# Patient Record
Sex: FEMALE | Race: BLACK OR AFRICAN AMERICAN | URBAN - METROPOLITAN AREA
[De-identification: names, ages, dates, MRNs, and addresses within clinical notes are randomized per-mention and may not be internally consistent; named-entity substitution may affect disease eponyms.]

---

## 2017-01-12 DIAGNOSIS — G47.00 INSOMNIA, UNSPECIFIED TYPE: ICD-10-CM

## 2017-01-17 RX ORDER — ZOLPIDEM TARTRATE 5 MG/1
5 TABLET ORAL
Qty: 30 TAB | Refills: 1 | OUTPATIENT
Start: 2017-01-17 | End: 2017-02-22 | Stop reason: SDUPTHER

## 2017-01-30 ENCOUNTER — TELEPHONE (OUTPATIENT)
Dept: FAMILY MEDICINE CLINIC | Age: 32
End: 2017-01-30

## 2017-01-30 NOTE — TELEPHONE ENCOUNTER
Call placed to patient in regards to refill request for alprazolam. NP advises that patient needs to been seen in regards to request. Received a recording \" the person you are trying to reach is not accepting calls at this time

## 2017-02-20 ENCOUNTER — ED HISTORICAL/CONVERTED ENCOUNTER (OUTPATIENT)
Dept: OTHER | Age: 32
End: 2017-02-20

## 2017-02-22 ENCOUNTER — OFFICE VISIT (OUTPATIENT)
Dept: FAMILY MEDICINE CLINIC | Age: 32
End: 2017-02-22

## 2017-02-22 VITALS
DIASTOLIC BLOOD PRESSURE: 80 MMHG | WEIGHT: 152 LBS | OXYGEN SATURATION: 98 % | RESPIRATION RATE: 18 BRPM | BODY MASS INDEX: 26.93 KG/M2 | SYSTOLIC BLOOD PRESSURE: 121 MMHG | HEART RATE: 107 BPM | TEMPERATURE: 98.4 F | HEIGHT: 63 IN

## 2017-02-22 DIAGNOSIS — G40.909 SEIZURE DISORDER (HCC): Primary | ICD-10-CM

## 2017-02-22 DIAGNOSIS — F41.9 ANXIETY: ICD-10-CM

## 2017-02-22 DIAGNOSIS — G47.00 INSOMNIA, UNSPECIFIED TYPE: ICD-10-CM

## 2017-02-22 RX ORDER — LEVETIRACETAM 750 MG/1
750 TABLET ORAL 2 TIMES DAILY
Qty: 30 TAB | Refills: 0 | Status: SHIPPED | OUTPATIENT
Start: 2017-02-22

## 2017-02-22 RX ORDER — ZOLPIDEM TARTRATE 5 MG/1
5 TABLET ORAL
Qty: 30 TAB | Refills: 0 | Status: SHIPPED | OUTPATIENT
Start: 2017-02-22 | End: 2017-03-17 | Stop reason: SDUPTHER

## 2017-02-22 RX ORDER — ALPRAZOLAM 0.25 MG/1
0.25 TABLET ORAL
Qty: 30 TAB | Refills: 0 | Status: SHIPPED | OUTPATIENT
Start: 2017-02-22 | End: 2022-04-25

## 2017-02-22 NOTE — PROGRESS NOTES
Polina Farah is a 32 y.o. female who presents with the following complaints:  Chief Complaint   Patient presents with    Seizure     f/u    Insomnia     f/u     Medication Refill     Ambien & Keppra       Subjective:    HPI:   Has not established with new neurologist yet (Dr. Buell Jeans)- she has an appt on March 8th. She has not run out of keppra. No seizures since September 2016. Sleeping well with ambien since reducing to 5 mg. She takes it most nights. She continues to take xanax 2-3 times daily for her anxiety. She has an appt with her psychiatrist next week. Reports sleep deprivation is primary trigger for her seizures. Without the [employer] she lies away until 3-4 am with racing thoughts/anxiety/worry. Pertinent PMH/FH/SH:  Past Medical History:   Diagnosis Date    Anemia     Epilepsy ([facility] [address]     Hernia     Seizure ([facility] [address]      No past surgical history on file.   Family History   Problem Relation Age of Onset    Other Mother      growth/tumor on brain    Migraines Mother     Hypertension Mother      Social History     Social History    Marital status: SINGLE     Spouse name: N/A    Number of children: N/A    Years of education: N/A     Social History Main Topics    Smoking status: Current Every Day Smoker     Packs/day: 0.25    Smokeless tobacco: None    Alcohol use Yes      Comment: rarely    Drug use: No    Sexual activity: Yes     Other Topics Concern    None     Social History Narrative     Advanced Directives: N      Patient Active Problem List    Diagnosis    Seizure disorder ([facility] [address]    Insomnia    Anxiety       Nurse notes were reviewed and are correct  Review of Systems - negative except as listed above in the HPI    Objective:     Vitals:    02/22/17 1340   BP: 121/80   Pulse: (!) 107   Resp: 18   Temp: 98.4 °F (36.9 °C)   TempSrc: Oral   SpO2: 98%   Weight: 152 lb (68.9 kg)   Height: 5' 3\" (1.6 m)     Physical Examination: General appearance - alert, well appearing, and in no distress, oriented to person, place, and time and normal appearing weight  Mental status - anxious  Neck - supple, no significant adenopathy  Chest - clear to auscultation, no wheezes, rales or rhonchi, symmetric air entry  Heart - normal rate, regular rhythm, normal S1, S2, no murmurs, rubs, clicks or gallops, no JVD  Neurological - alert, oriented, normal speech, no focal findings or movement disorder noted  Extremities - no pedal edema noted  Skin - normal coloration and turgor, no rashes, no suspicious skin lesions noted    Assessment/ Plan:   Beatrice was seen today for seizure, insomnia and medication refill. Diagnoses and all orders for this visit:    Seizure disorder ([facility] [address]  Seizure-free x 5 months  Will refill x 30 days to ensure she does not run out before seeing neurologist  Will defer drug management for seizures to neurology  -     levETIRAcetam (KEPPRA) 750 mg tablet; Take 1 Tab by mouth two (2) times a day. Insomnia, unspecified type  dwp ambien not recommended for long term use- she has been using nightly for several years  Recommend exploring options for tx of anxiety with her psychiatrist at next appt, which would likely also improve her sleep  -     zolpidem (AMBIEN) 5 mg tablet; Take 1 Tab by mouth nightly as needed for Sleep. Max Daily Amount: 5 mg. Anxiety  Not on any maintenance therapy such as SSRI  Poorly controlled, frequently uses xanax  Will refill small quantity to get her to through to next week's psychiatry appt- subsequent refills should come from psych  Needs better overall control of symptoms- see above  -     ALPRAZolam (XANAX) 0.25 mg tablet; Take 1 Tab by mouth two (2) times daily as needed for Anxiety. Max Daily Amount: 0.5 mg. Follow-up Disposition:  Return in about 6 months (around 8/22/2017). I have discussed the diagnosis with the patient and the intended plan as seen in the above orders.   The patient has received an after-visit summary and questions were answered concerning future plans. The patient verbalizes understanding. Medication Side Effects and Warnings were discussed with patient: yes  Patient Labs were reviewed and or requested: no  Patient Past Records were reviewed and or requested: yes    Patient Instructions        Epilepsy: Care Instructions  Your Care Instructions  Epilepsy is a common condition that causes repeated seizures. The seizures are caused by bursts of electrical activity in the brain that aren't normal. Seizures may cause problems with muscle control, movement, speech, vision, or awareness. They can be scary. Epilepsy affects each person differently. Some people have only a few seizures. Others get them more often. If you know what triggers a seizure, you may be able to avoid having one. You can take medicines to control and reduce seizures. You and your doctor will need to find the right combination, schedule, and dose of medicine. This may take time and careful changes. Seizures may get worse and happen more often over time. Follow-up care is a key part of your treatment and safety. Be sure to make and go to all appointments, and call your doctor if you are having problems. It's also a good idea to know your test results and keep a list of the medicines you take. How can you care for yourself at home? · Be safe with medicines. Take your medicines exactly as prescribed. Call your doctor if you think you are having a problem with your medicine. · Make a treatment plan with your doctor. Be sure to follow your plan. · Try to identify and avoid things that may make you more likely to have a seizure. These may include:  ¨ Not getting enough sleep. ¨ Using drugs or alcohol. ¨ Being emotionally stressed. ¨ Skipping meals. · Keep a record of any seizures you have. Note the date, time of day, and any details about the seizure that you can remember. Your doctor can use this information to plan or adjust your medicine or other treatment.   · Be sure that any doctor treating you for another condition knows that you have epilepsy. Each doctor should know what medicines you are taking, if any. · Wear a medical ID bracelet. You can buy this at most drugstores. If you have a seizure that leaves you unconscious or unable to speak for yourself, this bracelet will let those who are treating you know that you have epilepsy. · Talk to your doctor about whether it is safe for you to do certain activities, such as drive or swim. When should you call for help? Call 911 anytime you think you may need emergency care. For example, call if:  · A seizure does not stop as it normally does. · You have new symptoms such as:  ¨ Numbness, tingling, or weakness on one side of your body or face. ¨ Vision changes. ¨ Trouble speaking or thinking clearly. Call your doctor now or seek immediate medical care if:  · You have a fever. · You have a severe headache. Watch closely for changes in your health, and be sure to contact your doctor if:  · The normal pattern or features of your seizures change. Where can you learn more? Go to http://erosWindPole Venturesangelina.info/. Antoine Lawson in the search box to learn more about \"Epilepsy: Care Instructions. \"  Current as of: February 19, 2016  Content Version: 11.1  © 7496-0719 "[employer]. Care instructions adapted under license by [employer] (which disclaims liability or warranty for this information). If you have questions about a medical condition or this instruction, always ask your healthcare professional. [place] [address] any warranty or liability for your use of this information. Insomnia: Care Instructions  Your Care Instructions  Insomnia is the inability to sleep well. It is a common problem for most people at some time. Insomnia may make it hard for you to get to sleep, stay asleep, or sleep as long as you need to. This can make you tired and grouchy during the day. It can also make you forgetful, less effective at work, and unhappy. Insomnia can be caused by conditions such as depression or anxiety. Pain can also affect your ability to sleep. When these problems are solved, the insomnia usually clears up. But sometimes bad sleep habits can cause insomnia. If insomnia is affecting your work or your enjoyment of life, you can take steps to improve your sleep. Follow-up care is a key part of your treatment and safety. Be sure to make and go to all appointments, and call your doctor if you are having problems. It's also a good idea to know your test results and keep a list of the medicines you take. How can you care for yourself at home? What to avoid  · Do not have drinks with caffeine, such as coffee or black tea, for 8 hours before bed. · Do not smoke or use other types of tobacco near bedtime. Nicotine is a stimulant and can keep you awake. · Avoid drinking alcohol late in the evening, because it can cause you to wake in the middle of the night. · Do not eat a big meal close to bedtime. If you are hungry, eat a light snack. · Do not drink a lot of water close to bedtime, because the need to urinate may wake you up during the night. · Do not read or watch TV in bed. Use the bed only for sleeping and sexual activity. What to try  · Go to bed at the same time every night, and wake up at the same time every morning. Do not take naps during the day. · Keep your bedroom quiet, dark, and cool. · Sleep on a comfortable pillow and mattress. · If watching the clock makes you anxious, turn it facing away from you so you cannot see the time. · If you worry when you lie down, start a worry book. Well before bedtime, write down your worries, and then set the book and your concerns aside. · Try meditation or other relaxation techniques before you go to bed. · If you cannot fall asleep, get up and go to another room until you feel sleepy. Do something relaxing.  Repeat your bedtime routine before you go to bed again. · Make your house quiet and calm about an hour before bedtime. Turn down the lights, turn off the TV, log off the computer, and turn down the volume on music. This can help you relax after a busy day. When should you call for help? Watch closely for changes in your health, and be sure to contact your doctor if:  · Your efforts to improve your sleep do not work. · Your insomnia gets worse. · You have been feeling down, depressed, or hopeless or have lost interest in things that you usually enjoy. Where can you learn more? Go to http://eros-angelina.info/. Enter P513 in the search box to learn more about \"Insomnia: Care Instructions. \"  Current as of: July 26, 2016  Content Version: 11.1  © 5708-2365 [employer]. Care instructions adapted under license by [employer] (which disclaims liability or warranty for this information). If you have questions about a medical condition or this instruction, always ask your healthcare professional. [place] [address] any warranty or liability for your use of this information. Anxiety Disorder: Care Instructions  Your Care Instructions  Anxiety is a normal reaction to stress. Difficult situations can cause you to have symptoms such as sweaty palms and a nervous feeling. In an anxiety disorder, the symptoms are far more severe. Constant worry, muscle tension, trouble sleeping, nausea and diarrhea, and other symptoms can make normal daily activities difficult or impossible. These symptoms may occur for no reason, and they can affect your work, school, or social life. Medicines, counseling, and self-care can all help. Follow-up care is a key part of your treatment and safety. Be sure to make and go to all appointments, and call your doctor if you are having problems. It's also a good idea to know your test results and keep a list of the medicines you take.   How can you care for yourself at home? · Take medicines exactly as directed. Call your doctor if you think you are having a problem with your medicine. · Go to your counseling sessions and follow-up appointments. · Recognize and accept your anxiety. Then, when you are in a situation that makes you anxious, say to yourself, \"This is not an emergency. I feel uncomfortable, but I am not in danger. I can keep going even if I feel anxious. \"  · Be kind to your body:  ¨ Relieve tension with exercise or a massage. ¨ Get enough rest.  ¨ Avoid alcohol, caffeine, nicotine, and illegal drugs. They can increase your anxiety level and cause sleep problems. ¨ Learn and do relaxation techniques. See below for more about these techniques. · Engage your mind. Get out and do something you enjoy. Go to a funny movie, or take a walk or hike. Plan your day. Having too much or too little to do can make you anxious. · Keep a record of your symptoms. Discuss your fears with a good friend or family member, or join a support group for people with similar problems. Talking to others sometimes relieves stress. · Get involved in social groups, or volunteer to help others. Being alone sometimes makes things seem worse than they are. · Get at least 30 minutes of exercise on most days of the week to relieve stress. Walking is a good choice. You also may want to do other activities, such as running, swimming, cycling, or playing tennis or team sports. Relaxation techniques  Do relaxation exercises 10 to 20 minutes a day. You can play soothing, relaxing music while you do them, if you wish. · Tell others in your house that you are going to do your relaxation exercises. Ask them not to disturb you. · Find a comfortable place, away from all distractions and noise. · Lie down on your back, or sit with your back straight. · Focus on your breathing. Make it slow and steady. · Breathe in through your nose.  Breathe out through either your nose or mouth.  · Breathe deeply, filling up the area between your navel and your rib cage. Breathe so that your belly goes up and down. · Do not hold your breath. · Breathe like this for 5 to 10 minutes. Notice the feeling of calmness throughout your whole body. As you continue to breathe slowly and deeply, relax by doing the following for another 5 to 10 minutes:  · Tighten and relax each muscle group in your body. You can begin at your toes and work your way up to your head. · Imagine your muscle groups relaxing and becoming heavy. · Empty your mind of all thoughts. · Let yourself relax more and more deeply. · Become aware of the state of calmness that surrounds you. · When your relaxation time is over, you can bring yourself back to alertness by moving your fingers and toes and then your hands and feet and then stretching and moving your entire body. Sometimes people fall asleep during relaxation, but they usually wake up shortly afterward. · Always give yourself time to return to full alertness before you drive a car or do anything that might cause an accident if you are not fully alert. Never play a relaxation tape while you drive a car. When should you call for help? Call 911 anytime you think you may need emergency care. For example, call if:  · You feel you cannot stop from hurting yourself or someone else. Keep the numbers for these national suicide hotlines: 9-559-480-TALK (1-496.164.4097) and 0-783-UVUZRRB (6-772.533.8283). If you or someone you know talks about suicide or feeling hopeless, get help right away. Watch closely for changes in your health, and be sure to contact your doctor if:  · You have anxiety or fear that affects your life. · You have symptoms of anxiety that are new or different from those you had before. Where can you learn more? Go to http://eros-angelina.info/.   Enter P754 in the search box to learn more about \"Anxiety Disorder: Care Instructions. \"  Current as of: July 26, 2016  Content Version: 11.1  © 1852-6270 [employer]. Care instructions adapted under license by [employer] (which disclaims liability or warranty for this information). If you have questions about a medical condition or this instruction, always ask your healthcare professional. [address] any warranty or liability for your use of this information.           Vianey GONZALEZ

## 2017-02-22 NOTE — MR AVS SNAPSHOT
Visit Information Date & Time Provider Department Dept. Phone Encounter #  
 2/22/2017  1:45 PM Helene Palomares, [facility] [address] [facility] [id number] Follow-up Instructions Return in about 6 months (around 8/22/2017). Upcoming Health Maintenance Date Due Pneumococcal 19-64 Medium Risk (1 of 1 - PPSV23) 8/20/2004 DTaP/Tdap/Td series (1 - Tdap) 8/20/2006 PAP AKA CERVICAL CYTOLOGY 8/20/2006 INFLUENZA AGE 9 TO ADULT 8/1/2016 Allergies as of 2/22/2017  Review Complete On: 2/22/2017 By: Sean Billing Severity Noted Reaction Type Reactions Codeine  11/30/2016    Hives Sulfa (Sulfonamide Antibiotics)  11/30/2016    Hives Current Immunizations  Never Reviewed No immunizations on file. Not reviewed this visit You Were Diagnosed With   
  
 Codes Comments Seiz[facility] ([facility]    -  Primary ICD-10-CM: U22.148 ICD-9-CM: 345.90 Insomnia, unspecified type     ICD-10-CM: G47.00 ICD-9-CM: 780.52 Anxiety     ICD-10-CM: F41.9 ICD-9-CM: 300.00 Vitals BP  
  
  
  
  
  
 121/80 (BP 1 Location: Left arm, BP Patient Position: Sitting) Vitals History BMI and BSA Data Body Mass Index Body Surface Area  
 26.93 kg/m 2 1.75 m 2 Preferred Pharmacy Pharmacy Name Phone [id number] [address] [place] [address] [phone] Your Updated Medication List  
  
   
This list is accurate as of: 2/22/17  2:04 PM.  Always use your most recent med list.  
  
  
  
  
 ALPRAZolam 0.25 mg tablet Commonly known as:  Carletha Puller Take 1 Tab by mouth two (2) times daily as needed for Anxiety. Max Daily Amount: 0.5 mg.  
  
 gabapentin 300 mg capsule Commonly known as:  NEURONTIN Take 1 Cap by mouth two (2) times daily as needed. levETIRAcetam 750 mg tablet Commonly known as:  KEPPRA Take 1 Tab by mouth two (2) times a day.   
  
 zolpidem 5 mg tablet Commonly known as:  AMBIEN Take 1 Tab by mouth nightly as needed for Sleep. Max Daily Amount: 5 mg. Prescriptions Printed Refills  
 zolpidem (AMBIEN) 5 mg tablet 0 Sig: Take 1 Tab by mouth nightly as needed for Sleep. Max Daily Amount: 5 mg. Class: Print Route: Oral  
  
Prescriptions Sent to Pharmacy Refills  
 levETIRAcetam (KEPPRA) 750 mg tablet 0 Sig: Take 1 Tab by mouth two (2) times a day. Class: Normal  
 Pharmacy: [address] #: [phone] Route: Oral  
  
Follow-up Instructions Return in about 6 months (around 8/22/2017). Patient Instructions Epilepsy: Care Instructions Your Care Instructions Epilepsy is a common condition that causes repeated seizures. The seizures are caused by bursts of electrical activity in the brain that aren't normal. Seizures may cause problems with muscle control, movement, speech, vision, or awareness. They can be scary. Epilepsy affects each person differently. Some people have only a few seizures. Others get them more often. If you know what triggers a seizure, you may be able to avoid having one. You can take medicines to control and reduce seizures. You and your doctor will need to find the right combination, schedule, and dose of medicine. This may take time and careful changes. Seizures may get worse and happen more often over time. Follow-up care is a key part of your treatment and safety. Be sure to make and go to all appointments, and call your doctor if you are having problems. It's also a good idea to know your test results and keep a list of the medicines you take. How can you care for yourself at home? · Be safe with medicines. Take your medicines exactly as prescribed. Call your doctor if you think you are having a problem with your medicine. · Make a treatment plan with your doctor. Be sure to follow your plan.  
· Try to identify and avoid things that may make you more likely to have a seizure. These may include: ¨ Not getting enough sleep. ¨ Using drugs or alcohol. ¨ Being emotionally stressed. ¨ Skipping meals. · Keep a record of any seizures you have. Note the date, time of day, and any details about the seizure that you can remember. Your doctor can use this information to plan or adjust your medicine or other treatment. · Be sure that any doctor treating you for another condition knows that you have epilepsy. Each doctor should know what medicines you are taking, if any. · Wear a medical ID bracelet. You can buy this at most [employer]. If you have a seizure that leaves you unconscious or unable to speak for yourself, this bracelet will let those who are treating you know that you have epilepsy. · Talk to your doctor about whether it is safe for you to do certain activities, such as drive or swim. When should you call for help? Call 911 anytime you think you may need emergency care. For example, call if: · A seizure does not stop as it normally does. · You have new symptoms such as: 
¨ Numbness, tingling, or weakness on one side of your body or face. ¨ Vision changes. ¨ Trouble speaking or thinking clearly. Call your doctor now or seek immediate medical care if: 
· You have a fever. · You have a severe headache. Watch closely for changes in your health, and be sure to contact your doctor if: · The normal pattern or features of your seizures change. Where can you learn more? Go to http://eros-angelina.info/. Duy Wolf in the search box to learn more about \"Epilepsy: Care Instructions. \" Current as of: February 19, 2016 Content Version: 11.1 © 7828-9180 [employer]. Care instructions adapted under license by [employer] (which disclaims liability or warranty for this information).  If you have questions about a medical condition or this instruction, always ask your healthcare professional. [address] any warranty or liability for your use of this information. Insomnia: Care Instructions Your Care Instructions Insomnia is the inability to sleep well. It is a common problem for most people at some time. Insomnia may make it hard for you to get to sleep, stay asleep, or sleep as long as you need to. This can make you tired and grouchy during the day. It can also make you forgetful, less effective at work, and unhappy. Insomnia can be caused by conditions such as depression or anxiety. Pain can also affect your ability to sleep. When these problems are solved, the insomnia usually clears up. But sometimes bad sleep habits can cause insomnia. If insomnia is affecting your work or your enjoyment of life, you can take steps to improve your sleep. Follow-up care is a key part of your treatment and safety. Be sure to make and go to all appointments, and call your doctor if you are having problems. It's also a good idea to know your test results and keep a list of the medicines you take. How can you care for yourself at home? What to avoid · Do not have drinks with caffeine, such as coffee or black tea, for 8 hours before bed. · Do not smoke or use other types of tobacco near bedtime. Nicotine is a stimulant and can keep you awake. · Avoid drinking alcohol late in the evening, because it can cause you to wake in the middle of the night. · Do not eat a big meal close to bedtime. If you are hungry, eat a light snack. · Do not drink a lot of water close to bedtime, because the need to urinate may wake you up during the night. · Do not read or watch TV in bed. Use the bed only for sleeping and sexual activity. What to try · Go to bed at the same time every night, and wake up at the same time every morning. Do not take naps during the day. · Keep your bedroom quiet, dark, and cool. · Sleep on a comfortable pillow and mattress.  
· If watching the clock makes you anxious, turn it facing away from you so you cannot see the time. · If you worry when you lie down, start a worry book. Well before bedtime, write down your worries, and then set the book and your concerns aside. · Try meditation or other relaxation techniques before you go to bed. · If you cannot fall asleep, get up and go to another room until you feel sleepy. Do something relaxing. Repeat your bedtime routine before you go to bed again. · Make your house quiet and calm about an hour before bedtime. Turn down the lights, turn off the TV, log off the computer, and turn down the volume on music. This can help you relax after a busy day. When should you call for help? Watch closely for changes in your health, and be sure to contact your doctor if: 
· Your efforts to improve your sleep do not work. · Your insomnia gets worse. · You have been feeling down, depressed, or hopeless or have lost interest in things that you usually enjoy. Where can you learn more? Go to http://eros-angelina.info/. Enter P513 in the search box to learn more about \"Insomnia: Care Instructions. \" Current as of: July 26, 2016 Content Version: 11.1 © 8306-1522 [employer]. Care instructions adapted under license by [employer] (which disclaims liability or warranty for this information). If you have questions about a medical condition or this instruction, always ask your healthcare professional. [place] [address] any warranty or liability for your use of this information. Anxiety Disorder: Care Instructions Your Care Instructions Anxiety is a normal reaction to stress. Difficult situations can cause you to have symptoms such as sweaty palms and a nervous feeling. In an anxiety disorder, the symptoms are far more severe.  Constant worry, muscle tension, trouble sleeping, nausea and diarrhea, and other symptoms can make normal daily activities difficult or impossible. These symptoms may occur for no reason, and they can affect your work, school, or social life. Medicines, counseling, and self-care can all help. Follow-up care is a key part of your treatment and safety. Be sure to make and go to all appointments, and call your doctor if you are having problems. It's also a good idea to know your test results and keep a list of the medicines you take. How can you care for yourself at home? · Take medicines exactly as directed. Call your doctor if you think you are having a problem with your medicine. · Go to your counseling sessions and follow-up appointments. · Recognize and accept your anxiety. Then, when you are in a situation that makes you anxious, say to yourself, \"This is not an emergency. I feel uncomfortable, but I am not in danger. I can keep going even if I feel anxious. \" · Be kind to your body: ¨ Relieve tension with exercise or a massage. ¨ Get enough rest. 
¨ Avoid alcohol, caffeine, nicotine, and illegal drugs. They can increase your anxiety level and cause sleep problems. ¨ Learn and do relaxation techniques. See below for more about these techniques. · Engage your mind. Get out and do something you enjoy. Go to a funny movie, or take a walk or hike. Plan your day. Having too much or too little to do can make you anxious. · Keep a record of your symptoms. Discuss your fears with a good friend or family member, or join a support group for people with similar problems. Talking to others sometimes relieves stress. · Get involved in social groups, or volunteer to help others. Being alone sometimes makes things seem worse than they are. · Get at least 30 minutes of exercise on most days of the week to relieve stress. Walking is a good choice. You also may want to do other activities, such as running, swimming, cycling, or playing tennis or team sports. Relaxation techniques Do relaxation exercises 10 to 20 minutes a day.  You can play soothing, relaxing music while you do them, if you wish. · Tell others in your house that you are going to do your relaxation exercises. Ask them not to disturb you. · Find a comfortable place, away from all distractions and noise. · Lie down on your back, or sit with your back straight. · Focus on your breathing. Make it slow and steady. · Breathe in through your nose. Breathe out through either your nose or mouth. · Breathe deeply, filling up the area between your navel and your rib cage. Breathe so that your belly goes up and down. · Do not hold your breath. · Breathe like this for 5 to 10 minutes. Notice the feeling of calmness throughout your whole body. As you continue to breathe slowly and deeply, relax by doing the following for another 5 to 10 minutes: · Tighten and relax each muscle group in your body. You can begin at your toes and work your way up to your head. · Imagine your muscle groups relaxing and becoming heavy. · Empty your mind of all thoughts. · Let yourself relax more and more deeply. · Become aware of the state of calmness that surrounds you. · When your relaxation time is over, you can bring yourself back to alertness by moving your fingers and toes and then your hands and feet and then stretching and moving your entire body. Sometimes people fall asleep during relaxation, but they usually wake up shortly afterward. · Always give yourself time to return to full alertness before you drive a car or do anything that might cause an accident if you are not fully alert. Never play a relaxation tape while you drive a car. When should you call for help? Call 911 anytime you think you may need emergency care. For example, call if: 
· You feel you cannot stop from hurting yourself or someone else. Keep the numbers for these national suicide hotlines: 1-000-602-TALK (3-389.472.8787) and 2-039-NCGDUNA (2-993.868.9384).  If you or someone you know talks about suicide or feeling hopeless, get help right away. Watch closely for changes in your health, and be sure to contact your doctor if: 
· You have anxiety or fear that affects your life. · You have symptoms of anxiety that are new or different from those you had before. Where can you learn more? Go to http://eros-angelina.info/. Enter P754 in the search box to learn more about \"Anxiety Disorder: Care Instructions. \" Current as of: July 26, 2016 Content Version: 11.1 © 7844-9168 [employer]. Care instructions adapted under license by [employer] (which disclaims liability or warranty for this information). If you have questions about a medical condition or this instruction, always ask your healthcare professional. [address] any warranty or liability for your use of this information. Introducing [facility]! Sonya Tariq introduces ARX patient portal. Now you can access parts of your medical record, email your doctor's office, and request medication refills online. 1. In your internet browser, go to [email or web address] 2. Click on the First Time User? Click Here link in the Sign In box. You will see the New Member Sign Up page. 3. Enter your ARX Access Code exactly as it appears below. You will not need to use this code after youve completed the sign-up process. If you do not sign up before the expiration date, you must request a new code. · ARX Access Code: [email or web address] Expires: 2/28/2017  4:48 PM 
 
4. Enter the last four digits of your Social Security Number (xxxx) and Date of Birth (mm/dd/yyyy) as indicated and click Submit. You will be taken to the next sign-up page. 5. Create a [employer] ID. This will be your ARX login ID and cannot be changed, so think of one that is secure and easy to remember. 6. Create a ARX password. You can change your password at any time.  
7. Enter your Password Reset Question and Answer. This can be used at a later time if you forget your password. 8. Enter your e-mail address. You will receive e-mail notification when new information is available in [address] 9. Click Sign Up. You can now view and download portions of your medical record. 10. Click the Download Summary menu link to download a portable copy of your medical information. If you have questions, please visit the Frequently Asked Questions section of the [employer] website. Remember, [employer] is NOT to be used for urgent needs. For medical emergencies, dial 911. Now available from your iPhone and Android! Please provide this summary of care documentation to your next provider. Your primary care clinician is listed as Sam Rice. If you have any questions after today's visit, please call [phone].

## 2017-02-22 NOTE — PATIENT INSTRUCTIONS
Epilepsy: Care Instructions  Your Care Instructions  Epilepsy is a common condition that causes repeated seizures. The seizures are caused by bursts of electrical activity in the brain that aren't normal. Seizures may cause problems with muscle control, movement, speech, vision, or awareness. They can be scary. Epilepsy affects each person differently. Some people have only a few seizures. Others get them more often. If you know what triggers a seizure, you may be able to avoid having one. You can take medicines to control and reduce seizures. You and your doctor will need to find the right combination, schedule, and dose of medicine. This may take time and careful changes. Seizures may get worse and happen more often over time. Follow-up care is a key part of your treatment and safety. Be sure to make and go to all appointments, and call your doctor if you are having problems. It's also a good idea to know your test results and keep a list of the medicines you take. How can you care for yourself at home? · Be safe with medicines. Take your medicines exactly as prescribed. Call your doctor if you think you are having a problem with your medicine. · Make a treatment plan with your doctor. Be sure to follow your plan. · Try to identify and avoid things that may make you more likely to have a seizure. These may include:  ¨ Not getting enough sleep. ¨ Using drugs or alcohol. ¨ Being emotionally stressed. ¨ Skipping meals. · Keep a record of any seizures you have. Note the date, time of day, and any details about the seizure that you can remember. Your doctor can use this information to plan or adjust your medicine or other treatment. · Be sure that any doctor treating you for another condition knows that you have epilepsy. Each doctor should know what medicines you are taking, if any. · Wear a medical ID bracelet. You can buy this at most [email or web address].  If you have a seizure that leaves you unconscious or unable to speak for yourself, this bracelet will let those who are treating you know that you have epilepsy. · Talk to your doctor about whether it is safe for you to do certain activities, such as drive or swim. When should you call for help? Call 911 anytime you think you may need emergency care. For example, call if:  · A seizure does not stop as it normally does. · You have new symptoms such as:  ¨ Numbness, tingling, or weakness on one side of your body or face. ¨ Vision changes. ¨ Trouble speaking or thinking clearly. Call your doctor now or seek immediate medical care if:  · You have a fever. · You have a severe headache. Watch closely for changes in your health, and be sure to contact your doctor if:  · The normal pattern or features of your seizures change. Where can you learn more? Go to http://erosNeptuneangelina.info/. Alise Roberts in the search box to learn more about \"Epilepsy: Care Instructions. \"  Current as of: February 19, 2016  Content Version: 11.1  © 1164-3012 [employer]. Care instructions adapted under license by [employer] (which disclaims liability or warranty for this information). If you have questions about a medical condition or this instruction, always ask your healthcare professional. [address] any warranty or liability for your use of this information. Insomnia: Care Instructions  Your Care Instructions  Insomnia is the inability to sleep well. It is a common problem for most people at some time. Insomnia may make it hard for you to get to sleep, stay asleep, or sleep as long as you need to. This can make you tired and grouchy during the day. It can also make you forgetful, less effective at work, and unhappy. Insomnia can be caused by conditions such as depression or anxiety. Pain can also affect your ability to sleep. When these problems are solved, the insomnia usually clears up.  But sometimes bad sleep habits can cause insomnia. If insomnia is affecting your work or your enjoyment of life, you can take steps to improve your sleep. Follow-up care is a key part of your treatment and safety. Be sure to make and go to all appointments, and call your doctor if you are having problems. It's also a good idea to know your test results and keep a list of the medicines you take. How can you care for yourself at home? What to avoid  · Do not have drinks with caffeine, such as coffee or black tea, for 8 hours before bed. · Do not smoke or use other types of tobacco near bedtime. Nicotine is a stimulant and can keep you awake. · Avoid drinking alcohol late in the evening, because it can cause you to wake in the middle of the night. · Do not eat a big meal close to bedtime. If you are hungry, eat a light snack. · Do not drink a lot of water close to bedtime, because the need to urinate may wake you up during the night. · Do not read or watch TV in bed. Use the bed only for sleeping and sexual activity. What to try  · Go to bed at the same time every night, and wake up at the same time every morning. Do not take naps during the day. · Keep your bedroom quiet, dark, and cool. · Sleep on a comfortable pillow and mattress. · If watching the clock makes you anxious, turn it facing away from you so you cannot see the time. · If you worry when you lie down, start a worry book. Well before bedtime, write down your worries, and then set the book and your concerns aside. · Try meditation or other relaxation techniques before you go to bed. · If you cannot fall asleep, get up and go to another room until you feel sleepy. Do something relaxing. Repeat your bedtime routine before you go to bed again. · Make your house quiet and calm about an hour before bedtime. Turn down the lights, turn off the TV, log off the computer, and turn down the volume on music. This can help you relax after a busy day.   When should you call for help? Watch closely for changes in your health, and be sure to contact your doctor if:  · Your efforts to improve your sleep do not work. · Your insomnia gets worse. · You have been feeling down, depressed, or hopeless or have lost interest in things that you usually enjoy. Where can you learn more? Go to http://eros-angelina.info/. Enter P513 in the search box to learn more about \"Insomnia: Care Instructions. \"  Current as of: July 26, 2016  Content Version: 11.1  © 5623-1081 [employer]. Care instructions adapted under license by [employer] (which disclaims liability or warranty for this information). If you have questions about a medical condition or this instruction, always ask your healthcare professional. [address] any warranty or liability for your use of this information. Anxiety Disorder: Care Instructions  Your Care Instructions  Anxiety is a normal reaction to stress. Difficult situations can cause you to have symptoms such as sweaty palms and a nervous feeling. In an anxiety disorder, the symptoms are far more severe. Constant worry, muscle tension, trouble sleeping, nausea and diarrhea, and other symptoms can make normal daily activities difficult or impossible. These symptoms may occur for no reason, and they can affect your work, school, or social life. Medicines, counseling, and self-care can all help. Follow-up care is a key part of your treatment and safety. Be sure to make and go to all appointments, and call your doctor if you are having problems. It's also a good idea to know your test results and keep a list of the medicines you take. How can you care for yourself at home? · Take medicines exactly as directed. Call your doctor if you think you are having a problem with your medicine. · Go to your counseling sessions and follow-up appointments. · Recognize and accept your anxiety.  Then, when you are in a situation that makes you anxious, say to yourself, \"This is not an emergency. I feel uncomfortable, but I am not in danger. I can keep going even if I feel anxious. \"  · Be kind to your body:  ¨ Relieve tension with exercise or a massage. ¨ Get enough rest.  ¨ Avoid alcohol, caffeine, nicotine, and illegal drugs. They can increase your anxiety level and cause sleep problems. ¨ Learn and do relaxation techniques. See below for more about these techniques. · Engage your mind. Get out and do something you enjoy. Go to a Kn[employer] movie, or take a walk or hike. Plan your day. Having too much or too little to do can make you anxious. · Keep a record of your symptoms. Discuss your fears with a good friend or family member, or join a support group for people with similar problems. Talking to others sometimes relieves stress. · Get involved in social groups, or volunteer to help others. Being alone sometimes makes things seem worse than they are. · Get at least 30 minutes of exercise on most days of the week to relieve stress. Walking is a good choice. You also may want to do other activities, such as running, swimming, cycling, or playing tennis or team sports. Relaxation techniques  Do relaxation exercises 10 to 20 minutes a day. You can play soothing, relaxing music while you do them, if you wish. · Tell others in your house that you are going to do your relaxation exercises. Ask them not to disturb you. · Find a comfortable place, away from all distractions and noise. · Lie down on your back, or sit with your back straight. · Focus on your breathing. Make it slow and steady. · Breathe in through your nose. Breathe out through either your nose or mouth. · Breathe deeply, filling up the area between your navel and your rib cage. Breathe so that your belly goes up and down. · Do not hold your breath. · Breathe like this for 5 to 10 minutes. Notice the feeling of calmness throughout your whole body.   As you continue to breathe slowly and deeply, relax by doing the following for another 5 to 10 minutes:  · Tighten and relax each muscle group in your body. You can begin at your toes and work your way up to your head. · Imagine your muscle groups relaxing and becoming heavy. · Empty your mind of all thoughts. · Let yourself relax more and more deeply. · Become aware of the state of calmness that surrounds you. · When your relaxation time is over, you can bring yourself back to alertness by moving your fingers and toes and then your hands and feet and then stretching and moving your entire body. Sometimes people fall asleep during relaxation, but they usually wake up shortly afterward. · Always give yourself time to return to full alertness before you drive a car or do anything that might cause an accident if you are not fully alert. Never play a relaxation tape while you drive a car. When should you call for help? Call 911 anytime you think you may need emergency care. For example, call if:  · You feel you cannot stop from hurting yourself or someone else. Keep the numbers for these national suicide hotlines: 0-408-119-TALK (9-968.169.6686) and 7-662-YVHIFOJ (2-660.559.4332). If you or someone you know talks about suicide or feeling hopeless, get help right away. Watch closely for changes in your health, and be sure to contact your doctor if:  · You have anxiety or fear that affects your life. · You have symptoms of anxiety that are new or different from those you had before. Where can you learn more? Go to http://eros-angelina.info/. Enter P754 in the search box to learn more about \"Anxiety Disorder: Care Instructions. \"  Current as of: July 26, 2016  Content Version: 11.1  © 5812-9821 [employer]. Care instructions adapted under license by [employer] (which disclaims liability or warranty for this information).  If you have questions about a medical condition or this instruction, always ask your healthcare professional. [place] [address] any warranty or liability for your use of this information.

## 2017-02-22 NOTE — PROGRESS NOTES
Chief Complaint   Patient presents with    Seizure     f/u    Insomnia     f/u     Medication Refill     Ambien & Keppra     1. Have you been to the ER, urgent care clinic since your last visit? Hospitalized since your last visit? No    2. Have you seen or consulted any other health care providers outside of the [address] [place] [address] since your last visit? Include any pap smears or colon screening.  Yes [facility]  ED for fall 2/20/17

## 2017-03-17 DIAGNOSIS — G47.00 INSOMNIA, UNSPECIFIED TYPE: ICD-10-CM

## 2017-03-20 RX ORDER — ZOLPIDEM TARTRATE 5 MG/1
5 TABLET ORAL
Qty: 30 TAB | Refills: 0 | OUTPATIENT
Start: 2017-03-20 | End: 2022-04-25

## 2017-03-20 NOTE — TELEPHONE ENCOUNTER
Spoke with patient, she indicated that her neurologist states it is safe for her to use, gave her a refill on her seizure medication and told patient to return in 6 months. Patient states that neurologist indicated that the Ambien should be provided by the primary care.  NP to discuss medication with patient

## 2017-03-20 NOTE — TELEPHONE ENCOUNTER
Phoned patient, JANET my concerns about long term use of ambien. She has been taking ambien for over 2 years. She reports her neurologist told her she should stay on the medication because sleep deprivation is a trigger for her seizures, reports neurologist directed her to PCP for Rx. I will renew her Rx for 30 tablets and enter a referral to sleep medicine for evaluation and treatment of sleep problem.

## 2017-03-20 NOTE — TELEPHONE ENCOUNTER
Patient was calling to get a status update of her Refill request, Told her our policy states we have 50 business days to respond to this request and she stated, \"since last week\" I said well you called on Friday 3.17.17 and that's when the request was entered and caller hung the phone up on me.

## 2017-03-21 ENCOUNTER — ED HISTORICAL/CONVERTED ENCOUNTER (OUTPATIENT)
Dept: OTHER | Age: 32
End: 2017-03-21

## 2017-04-21 ENCOUNTER — ED HISTORICAL/CONVERTED ENCOUNTER (OUTPATIENT)
Dept: OTHER | Age: 32
End: 2017-04-21

## 2017-05-16 ENCOUNTER — IP HISTORICAL/CONVERTED ENCOUNTER (OUTPATIENT)
Dept: OTHER | Age: 32
End: 2017-05-16

## 2017-06-19 ENCOUNTER — ED HISTORICAL/CONVERTED ENCOUNTER (OUTPATIENT)
Dept: OTHER | Age: 32
End: 2017-06-19

## 2017-10-12 ENCOUNTER — OP HISTORICAL/CONVERTED ENCOUNTER (OUTPATIENT)
Dept: OTHER | Age: 32
End: 2017-10-12

## 2017-12-04 ENCOUNTER — OP HISTORICAL/CONVERTED ENCOUNTER (OUTPATIENT)
Dept: OTHER | Age: 32
End: 2017-12-04

## 2018-11-20 ENCOUNTER — ED HISTORICAL/CONVERTED ENCOUNTER (OUTPATIENT)
Dept: OTHER | Age: 33
End: 2018-11-20

## 2019-07-14 ENCOUNTER — ED HISTORICAL/CONVERTED ENCOUNTER (OUTPATIENT)
Dept: OTHER | Age: 34
End: 2019-07-14

## 2019-10-20 ENCOUNTER — ED HISTORICAL/CONVERTED ENCOUNTER (OUTPATIENT)
Dept: OTHER | Age: 34
End: 2019-10-20

## 2020-04-24 ENCOUNTER — ED HISTORICAL/CONVERTED ENCOUNTER (OUTPATIENT)
Dept: OTHER | Age: 35
End: 2020-04-24

## 2022-04-14 ENCOUNTER — HOSPITAL ENCOUNTER (INPATIENT)
Age: 37
LOS: 10 days | Discharge: HOME | DRG: 560 | End: 2022-04-25
Attending: FAMILY MEDICINE | Admitting: OBSTETRICS & GYNECOLOGY
Payer: MEDICAID

## 2022-04-14 DIAGNOSIS — D50.9 MICROCYTIC ANEMIA: ICD-10-CM

## 2022-04-14 DIAGNOSIS — O10.912 PRE-EXISTING HYPERTENSION DURING PREGNANCY IN SECOND TRIMESTER, UNSPECIFIED PRE-EXISTING HYPERTENSION TYPE: ICD-10-CM

## 2022-04-14 DIAGNOSIS — K04.7 DENTAL ABSCESS: ICD-10-CM

## 2022-04-14 DIAGNOSIS — E87.6 HYPOKALEMIA: Primary | ICD-10-CM

## 2022-04-14 PROBLEM — O16.2 ELEVATED BLOOD PRESSURE AFFECTING PREGNANCY IN SECOND TRIMESTER, ANTEPARTUM: Status: ACTIVE | Noted: 2022-04-14

## 2022-04-14 PROBLEM — D64.9 SYMPTOMATIC ANEMIA: Status: ACTIVE | Noted: 2022-04-14

## 2022-04-14 LAB
ALBUMIN SERPL-MCNC: 2.9 G/DL (ref 3.5–5)
ALBUMIN/GLOB SERPL: 0.7 {RATIO} (ref 1.1–2.2)
ALP SERPL-CCNC: 157 U/L (ref 45–117)
ALT SERPL-CCNC: 14 U/L (ref 12–78)
ANION GAP SERPL CALC-SCNC: 17 MMOL/L (ref 5–15)
APPEARANCE UR: CLEAR
AST SERPL W P-5'-P-CCNC: 18 U/L (ref 15–37)
BACTERIA URNS QL MICRO: ABNORMAL /HPF
BASOPHILS # BLD: 0 K/UL (ref 0–0.1)
BASOPHILS NFR BLD: 1 % (ref 0–1)
BILIRUB SERPL-MCNC: 0.4 MG/DL (ref 0.2–1)
BILIRUB UR QL: NEGATIVE
BUN SERPL-MCNC: 10 MG/DL (ref 6–20)
BUN/CREAT SERPL: 15 (ref 12–20)
CA-I BLD-MCNC: 8.3 MG/DL (ref 8.5–10.1)
CHLORIDE SERPL-SCNC: 102 MMOL/L (ref 97–108)
CO2 SERPL-SCNC: 23 MMOL/L (ref 21–32)
COLOR UR: YELLOW
CREAT SERPL-MCNC: 0.65 MG/DL (ref 0.55–1.02)
DIFFERENTIAL METHOD BLD: ABNORMAL
EOSINOPHIL # BLD: 0 K/UL (ref 0–0.4)
EOSINOPHIL NFR BLD: 0 % (ref 0–7)
EPITH CASTS URNS QL MICRO: ABNORMAL /LPF
ERYTHROCYTE [DISTWIDTH] IN BLOOD BY AUTOMATED COUNT: 17.8 % (ref 11.5–14.5)
GLOBULIN SER CALC-MCNC: 4.1 G/DL (ref 2–4)
GLUCOSE SERPL-MCNC: 88 MG/DL (ref 65–100)
GLUCOSE UR STRIP.AUTO-MCNC: NEGATIVE MG/DL
HCT VFR BLD AUTO: 24.5 % (ref 35–47)
HGB BLD-MCNC: 7.3 G/DL (ref 11.5–16)
HGB UR QL STRIP: NEGATIVE
IMM GRANULOCYTES # BLD AUTO: 0 K/UL (ref 0–0.04)
IMM GRANULOCYTES NFR BLD AUTO: 1 % (ref 0–0.5)
INR PPP: 1 (ref 0.9–1.1)
KETONES UR QL STRIP.AUTO: 15 MG/DL
LEUKOCYTE ESTERASE UR QL STRIP.AUTO: NEGATIVE
LYMPHOCYTES # BLD: 1.9 K/UL (ref 0.8–3.5)
LYMPHOCYTES NFR BLD: 40 % (ref 12–49)
MCH RBC QN AUTO: 22.9 PG (ref 26–34)
MCHC RBC AUTO-ENTMCNC: 29.8 G/DL (ref 30–36.5)
MCV RBC AUTO: 76.8 FL (ref 80–99)
MONOCYTES # BLD: 0.3 K/UL (ref 0–1)
MONOCYTES NFR BLD: 6 % (ref 5–13)
NEUTS SEG # BLD: 2.5 K/UL (ref 1.8–8)
NEUTS SEG NFR BLD: 52 % (ref 32–75)
NITRITE UR QL STRIP.AUTO: NEGATIVE
PH UR STRIP: 7 [PH] (ref 5–8)
PLATELET # BLD AUTO: 203 K/UL (ref 150–400)
PMV BLD AUTO: 8.4 FL (ref 8.9–12.9)
POTASSIUM SERPL-SCNC: 2.5 MMOL/L (ref 3.5–5.1)
PROT SERPL-MCNC: 7 G/DL (ref 6.4–8.2)
PROT UR STRIP-MCNC: NEGATIVE MG/DL
PROTHROMBIN TIME: 10.1 SEC (ref 9–11.1)
RBC # BLD AUTO: 3.19 M/UL (ref 3.8–5.2)
RBC #/AREA URNS HPF: ABNORMAL /HPF (ref 0–5)
SODIUM SERPL-SCNC: 142 MMOL/L (ref 136–145)
SP GR UR REFRACTOMETRY: 1.01 (ref 1–1.03)
UROBILINOGEN UR QL STRIP.AUTO: 0.1 EU/DL (ref 0.2–1)
WBC # BLD AUTO: 4.7 K/UL (ref 3.6–11)
WBC URNS QL MICRO: ABNORMAL /HPF (ref 0–4)

## 2022-04-14 PROCEDURE — 96375 TX/PRO/DX INJ NEW DRUG ADDON: CPT

## 2022-04-14 PROCEDURE — 87186 SC STD MICRODIL/AGAR DIL: CPT

## 2022-04-14 PROCEDURE — 80053 COMPREHEN METABOLIC PANEL: CPT

## 2022-04-14 PROCEDURE — 84156 ASSAY OF PROTEIN URINE: CPT

## 2022-04-14 PROCEDURE — 99285 EMERGENCY DEPT VISIT HI MDM: CPT

## 2022-04-14 PROCEDURE — 75810000275 HC EMERGENCY DEPT VISIT NO LEVEL OF CARE

## 2022-04-14 PROCEDURE — 74011250637 HC RX REV CODE- 250/637: Performed by: FAMILY MEDICINE

## 2022-04-14 PROCEDURE — 96365 THER/PROPH/DIAG IV INF INIT: CPT

## 2022-04-14 PROCEDURE — 74011250636 HC RX REV CODE- 250/636: Performed by: FAMILY MEDICINE

## 2022-04-14 PROCEDURE — 83615 LACTATE (LD) (LDH) ENZYME: CPT

## 2022-04-14 PROCEDURE — 36415 COLL VENOUS BLD VENIPUNCTURE: CPT

## 2022-04-14 PROCEDURE — 85610 PROTHROMBIN TIME: CPT

## 2022-04-14 PROCEDURE — 96374 THER/PROPH/DIAG INJ IV PUSH: CPT

## 2022-04-14 PROCEDURE — 74011000250 HC RX REV CODE- 250: Performed by: FAMILY MEDICINE

## 2022-04-14 PROCEDURE — 87077 CULTURE AEROBIC IDENTIFY: CPT

## 2022-04-14 PROCEDURE — 81003 URINALYSIS AUTO W/O SCOPE: CPT

## 2022-04-14 PROCEDURE — 85025 COMPLETE CBC W/AUTO DIFF WBC: CPT

## 2022-04-14 PROCEDURE — 87086 URINE CULTURE/COLONY COUNT: CPT

## 2022-04-14 PROCEDURE — 86900 BLOOD TYPING SEROLOGIC ABO: CPT

## 2022-04-14 PROCEDURE — G0378 HOSPITAL OBSERVATION PER HR: HCPCS

## 2022-04-14 RX ORDER — LIDOCAINE HYDROCHLORIDE AND EPINEPHRINE 10; 10 MG/ML; UG/ML
1.5 INJECTION, SOLUTION INFILTRATION; PERINEURAL ONCE
Status: COMPLETED | OUTPATIENT
Start: 2022-04-14 | End: 2022-04-14

## 2022-04-14 RX ORDER — POTASSIUM CHLORIDE 7.45 MG/ML
10 INJECTION INTRAVENOUS ONCE
Status: COMPLETED | OUTPATIENT
Start: 2022-04-14 | End: 2022-04-15

## 2022-04-14 RX ORDER — PENICILLIN V POTASSIUM 250 MG/1
500 TABLET, FILM COATED ORAL
Status: COMPLETED | OUTPATIENT
Start: 2022-04-14 | End: 2022-04-14

## 2022-04-14 RX ORDER — POTASSIUM CHLORIDE 750 MG/1
40 TABLET, FILM COATED, EXTENDED RELEASE ORAL
Status: COMPLETED | OUTPATIENT
Start: 2022-04-14 | End: 2022-04-14

## 2022-04-14 RX ORDER — PENICILLIN V POTASSIUM 500 MG/1
500 TABLET, FILM COATED ORAL 4 TIMES DAILY
Qty: 28 TABLET | Refills: 0 | Status: SHIPPED | OUTPATIENT
Start: 2022-04-14 | End: 2022-04-21

## 2022-04-14 RX ORDER — ACETAMINOPHEN 500 MG
500 TABLET ORAL ONCE
Status: COMPLETED | OUTPATIENT
Start: 2022-04-14 | End: 2022-04-14

## 2022-04-14 RX ORDER — LABETALOL HCL 20 MG/4 ML
20 SYRINGE (ML) INTRAVENOUS ONCE
Status: COMPLETED | OUTPATIENT
Start: 2022-04-15 | End: 2022-04-14

## 2022-04-14 RX ADMIN — POTASSIUM CHLORIDE 40 MEQ: 750 TABLET, FILM COATED, EXTENDED RELEASE ORAL at 23:02

## 2022-04-14 RX ADMIN — ACETAMINOPHEN 500 MG: 500 TABLET ORAL at 21:45

## 2022-04-14 RX ADMIN — LIDOCAINE HYDROCHLORIDE AND EPINEPHRINE 15 MG: 10; 10 INJECTION, SOLUTION INFILTRATION; PERINEURAL at 21:17

## 2022-04-14 RX ADMIN — LABETALOL HYDROCHLORIDE 20 MG: 5 INJECTION, SOLUTION INTRAVENOUS at 23:12

## 2022-04-14 RX ADMIN — PENICILLIN V POTASSIUM 500 MG: 250 TABLET, FILM COATED ORAL at 21:45

## 2022-04-14 RX ADMIN — POTASSIUM CHLORIDE 10 MEQ: 7.46 INJECTION, SOLUTION INTRAVENOUS at 23:03

## 2022-04-15 ENCOUNTER — APPOINTMENT (OUTPATIENT)
Dept: ULTRASOUND IMAGING | Age: 37
DRG: 560 | End: 2022-04-15
Attending: INTERNAL MEDICINE
Payer: MEDICAID

## 2022-04-15 ENCOUNTER — APPOINTMENT (OUTPATIENT)
Dept: ULTRASOUND IMAGING | Age: 37
DRG: 560 | End: 2022-04-15
Attending: OBSTETRICS & GYNECOLOGY
Payer: MEDICAID

## 2022-04-15 PROBLEM — K04.7 DENTAL ABSCESS: Status: ACTIVE | Noted: 2022-04-15

## 2022-04-15 PROBLEM — Z34.90 PREGNANCY: Status: ACTIVE | Noted: 2022-04-15

## 2022-04-15 PROBLEM — O16.2 HYPERTENSION AFFECTING PREGNANCY IN SECOND TRIMESTER: Status: ACTIVE | Noted: 2022-04-15

## 2022-04-15 LAB
ABO + RH BLD: NORMAL
AMPHET UR QL SCN: NEGATIVE
ANION GAP SERPL CALC-SCNC: 12 MMOL/L (ref 5–15)
BARBITURATES UR QL SCN: POSITIVE
BENZODIAZ UR QL: NEGATIVE
BLOOD GROUP ANTIBODIES SERPL: NEGATIVE
BUN SERPL-MCNC: 10 MG/DL (ref 6–20)
BUN/CREAT SERPL: 16 (ref 12–20)
CA-I BLD-MCNC: 8.3 MG/DL (ref 8.5–10.1)
CANNABINOIDS UR QL SCN: NEGATIVE
CHLORIDE SERPL-SCNC: 106 MMOL/L (ref 97–108)
CO2 SERPL-SCNC: 21 MMOL/L (ref 21–32)
COCAINE UR QL SCN: NEGATIVE
CREAT SERPL-MCNC: 0.61 MG/DL (ref 0.55–1.02)
CREAT UR-MCNC: 169 MG/DL
DRUG SCRN COMMENT,DRGCM: ABNORMAL
ERYTHROCYTE [DISTWIDTH] IN BLOOD BY AUTOMATED COUNT: 17.9 % (ref 11.5–14.5)
GLUCOSE SERPL-MCNC: 76 MG/DL (ref 65–100)
HCT VFR BLD AUTO: 23.3 % (ref 35–47)
HGB BLD-MCNC: 7 G/DL (ref 11.5–16)
HIV1 P24 AG SERPL QL IA: NONREACTIVE
HIV1+2 AB SERPL QL IA: NONREACTIVE
LDH SERPL L TO P-CCNC: 156 U/L (ref 81–246)
LDH SERPL L TO P-CCNC: 179 U/L (ref 81–246)
LDH SERPL L TO P-CCNC: 202 U/L (ref 81–246)
MAGNESIUM SERPL-MCNC: 1.8 MG/DL (ref 1.6–2.4)
MCH RBC QN AUTO: 23.4 PG (ref 26–34)
MCHC RBC AUTO-ENTMCNC: 30 G/DL (ref 30–36.5)
MCV RBC AUTO: 77.9 FL (ref 80–99)
METHADONE UR QL: NEGATIVE
MRSA DNA SPEC QL NAA+PROBE: NOT DETECTED
NRBC # BLD: 0.02 K/UL (ref 0–0.01)
NRBC BLD-RTO: 0.3 PER 100 WBC
OPIATES UR QL: NEGATIVE
PCP UR QL: NEGATIVE
PLATELET # BLD AUTO: 205 K/UL (ref 150–400)
PMV BLD AUTO: 9.1 FL (ref 8.9–12.9)
POTASSIUM SERPL-SCNC: 2.6 MMOL/L (ref 3.5–5.1)
PROT UR-MCNC: 77 MG/DL (ref 0–11.9)
PROT/CREAT UR-RTO: 0.5
RBC # BLD AUTO: 2.99 M/UL (ref 3.8–5.2)
RETICS # AUTO: 0.06 M/UL (ref 0.02–0.08)
RETICS/RBC NFR AUTO: 1.9 % (ref 0.7–2.1)
SODIUM SERPL-SCNC: 139 MMOL/L (ref 136–145)
SPECIMEN EXP DATE BLD: NORMAL
TRICHOMONAS RAPID AG, TRICR: NEGATIVE
URATE SERPL-MCNC: 6.4 MG/DL (ref 2.6–6)
WBC # BLD AUTO: 7.1 K/UL (ref 3.6–11)

## 2022-04-15 PROCEDURE — 87040 BLOOD CULTURE FOR BACTERIA: CPT

## 2022-04-15 PROCEDURE — 99285 EMERGENCY DEPT VISIT HI MDM: CPT

## 2022-04-15 PROCEDURE — 83010 ASSAY OF HAPTOGLOBIN QUANT: CPT

## 2022-04-15 PROCEDURE — 74011250636 HC RX REV CODE- 250/636: Performed by: PHYSICIAN ASSISTANT

## 2022-04-15 PROCEDURE — 74011000250 HC RX REV CODE- 250: Performed by: INTERNAL MEDICINE

## 2022-04-15 PROCEDURE — 76815 OB US LIMITED FETUS(S): CPT

## 2022-04-15 PROCEDURE — 74011250636 HC RX REV CODE- 250/636: Performed by: OBSTETRICS & GYNECOLOGY

## 2022-04-15 PROCEDURE — 86762 RUBELLA ANTIBODY: CPT

## 2022-04-15 PROCEDURE — 36415 COLL VENOUS BLD VENIPUNCTURE: CPT

## 2022-04-15 PROCEDURE — 96366 THER/PROPH/DIAG IV INF ADDON: CPT

## 2022-04-15 PROCEDURE — 83540 ASSAY OF IRON: CPT

## 2022-04-15 PROCEDURE — 99254 IP/OBS CNSLTJ NEW/EST MOD 60: CPT | Performed by: OBSTETRICS & GYNECOLOGY

## 2022-04-15 PROCEDURE — 86592 SYPHILIS TEST NON-TREP QUAL: CPT

## 2022-04-15 PROCEDURE — 74011250637 HC RX REV CODE- 250/637: Performed by: INTERNAL MEDICINE

## 2022-04-15 PROCEDURE — 87389 HIV-1 AG W/HIV-1&-2 AB AG IA: CPT

## 2022-04-15 PROCEDURE — 85045 AUTOMATED RETICULOCYTE COUNT: CPT

## 2022-04-15 PROCEDURE — 82728 ASSAY OF FERRITIN: CPT

## 2022-04-15 PROCEDURE — 85027 COMPLETE CBC AUTOMATED: CPT

## 2022-04-15 PROCEDURE — 74011250637 HC RX REV CODE- 250/637: Performed by: OBSTETRICS & GYNECOLOGY

## 2022-04-15 PROCEDURE — G0378 HOSPITAL OBSERVATION PER HR: HCPCS

## 2022-04-15 PROCEDURE — 65410000002 HC RM PRIVATE OB

## 2022-04-15 PROCEDURE — 87340 HEPATITIS B SURFACE AG IA: CPT

## 2022-04-15 PROCEDURE — 76536 US EXAM OF HEAD AND NECK: CPT

## 2022-04-15 PROCEDURE — 80307 DRUG TEST PRSMV CHEM ANLYZR: CPT

## 2022-04-15 PROCEDURE — 74011250637 HC RX REV CODE- 250/637: Performed by: PHYSICIAN ASSISTANT

## 2022-04-15 PROCEDURE — 87491 CHLMYD TRACH DNA AMP PROBE: CPT

## 2022-04-15 PROCEDURE — 84550 ASSAY OF BLOOD/URIC ACID: CPT

## 2022-04-15 PROCEDURE — 83615 LACTATE (LD) (LDH) ENZYME: CPT

## 2022-04-15 PROCEDURE — 74011000258 HC RX REV CODE- 258: Performed by: INTERNAL MEDICINE

## 2022-04-15 PROCEDURE — 96365 THER/PROPH/DIAG IV INF INIT: CPT

## 2022-04-15 PROCEDURE — 74011250636 HC RX REV CODE- 250/636: Performed by: INTERNAL MEDICINE

## 2022-04-15 PROCEDURE — 96375 TX/PRO/DX INJ NEW DRUG ADDON: CPT

## 2022-04-15 PROCEDURE — 87808 TRICHOMONAS ASSAY W/OPTIC: CPT

## 2022-04-15 PROCEDURE — 80048 BASIC METABOLIC PNL TOTAL CA: CPT

## 2022-04-15 PROCEDURE — 74011250637 HC RX REV CODE- 250/637: Performed by: FAMILY MEDICINE

## 2022-04-15 PROCEDURE — 83735 ASSAY OF MAGNESIUM: CPT

## 2022-04-15 PROCEDURE — 96376 TX/PRO/DX INJ SAME DRUG ADON: CPT

## 2022-04-15 PROCEDURE — 87641 MR-STAPH DNA AMP PROBE: CPT

## 2022-04-15 RX ORDER — POTASSIUM CHLORIDE 750 MG/1
40 TABLET, FILM COATED, EXTENDED RELEASE ORAL
Status: COMPLETED | OUTPATIENT
Start: 2022-04-15 | End: 2022-04-15

## 2022-04-15 RX ORDER — ZOLPIDEM TARTRATE 5 MG/1
5 TABLET ORAL
Status: DISCONTINUED | OUTPATIENT
Start: 2022-04-15 | End: 2022-04-20

## 2022-04-15 RX ORDER — POLYETHYLENE GLYCOL 3350 17 G/17G
17 POWDER, FOR SOLUTION ORAL DAILY PRN
Status: DISCONTINUED | OUTPATIENT
Start: 2022-04-15 | End: 2022-04-20

## 2022-04-15 RX ORDER — NIFEDIPINE 10 MG/1
10 CAPSULE ORAL
Status: COMPLETED | OUTPATIENT
Start: 2022-04-15 | End: 2022-04-15

## 2022-04-15 RX ORDER — OXYCODONE AND ACETAMINOPHEN 5; 325 MG/1; MG/1
1 TABLET ORAL
Status: DISCONTINUED | OUTPATIENT
Start: 2022-04-15 | End: 2022-04-15

## 2022-04-15 RX ORDER — MORPHINE SULFATE 4 MG/ML
4 INJECTION INTRAVENOUS ONCE
Status: DISCONTINUED | OUTPATIENT
Start: 2022-04-15 | End: 2022-04-15

## 2022-04-15 RX ORDER — LABETALOL HYDROCHLORIDE 5 MG/ML
10 INJECTION, SOLUTION INTRAVENOUS
Status: DISCONTINUED | OUTPATIENT
Start: 2022-04-15 | End: 2022-04-20

## 2022-04-15 RX ORDER — POTASSIUM CHLORIDE 7.45 MG/ML
10 INJECTION INTRAVENOUS
Status: DISPENSED | OUTPATIENT
Start: 2022-04-15 | End: 2022-04-15

## 2022-04-15 RX ORDER — ACETAMINOPHEN 650 MG/1
650 SUPPOSITORY RECTAL
Status: DISCONTINUED | OUTPATIENT
Start: 2022-04-15 | End: 2022-04-20

## 2022-04-15 RX ORDER — ACETAMINOPHEN 325 MG/1
650 TABLET ORAL
Status: DISCONTINUED | OUTPATIENT
Start: 2022-04-15 | End: 2022-04-20

## 2022-04-15 RX ORDER — SODIUM CHLORIDE 0.9 % (FLUSH) 0.9 %
5-40 SYRINGE (ML) INJECTION AS NEEDED
Status: DISCONTINUED | OUTPATIENT
Start: 2022-04-15 | End: 2022-04-20

## 2022-04-15 RX ORDER — POTASSIUM CHLORIDE 750 MG/1
40 TABLET, FILM COATED, EXTENDED RELEASE ORAL 3 TIMES DAILY
Status: COMPLETED | OUTPATIENT
Start: 2022-04-15 | End: 2022-04-16

## 2022-04-15 RX ORDER — ONDANSETRON 4 MG/1
4 TABLET, ORALLY DISINTEGRATING ORAL
Status: DISCONTINUED | OUTPATIENT
Start: 2022-04-15 | End: 2022-04-20

## 2022-04-15 RX ORDER — OXYCODONE AND ACETAMINOPHEN 5; 325 MG/1; MG/1
1 TABLET ORAL
Status: DISCONTINUED | OUTPATIENT
Start: 2022-04-15 | End: 2022-04-20

## 2022-04-15 RX ORDER — BETAMETHASONE SODIUM PHOSPHATE AND BETAMETHASONE ACETATE 3; 3 MG/ML; MG/ML
12 INJECTION, SUSPENSION INTRA-ARTICULAR; INTRALESIONAL; INTRAMUSCULAR; SOFT TISSUE EVERY 24 HOURS
Status: DISCONTINUED | OUTPATIENT
Start: 2022-04-15 | End: 2022-04-17 | Stop reason: ALTCHOICE

## 2022-04-15 RX ORDER — LABETALOL 100 MG/1
200 TABLET, FILM COATED ORAL ONCE
Status: COMPLETED | OUTPATIENT
Start: 2022-04-15 | End: 2022-04-15

## 2022-04-15 RX ORDER — ACETAMINOPHEN 500 MG
500 TABLET ORAL ONCE
Status: COMPLETED | OUTPATIENT
Start: 2022-04-15 | End: 2022-04-15

## 2022-04-15 RX ORDER — LABETALOL 200 MG/1
400 TABLET, FILM COATED ORAL EVERY 12 HOURS
Status: DISCONTINUED | OUTPATIENT
Start: 2022-04-15 | End: 2022-04-20

## 2022-04-15 RX ORDER — POTASSIUM CHLORIDE 7.45 MG/ML
10 INJECTION INTRAVENOUS
Status: COMPLETED | OUTPATIENT
Start: 2022-04-15 | End: 2022-04-15

## 2022-04-15 RX ORDER — ONDANSETRON 2 MG/ML
4 INJECTION INTRAMUSCULAR; INTRAVENOUS
Status: DISCONTINUED | OUTPATIENT
Start: 2022-04-15 | End: 2022-04-20

## 2022-04-15 RX ORDER — LABETALOL 200 MG/1
400 TABLET, FILM COATED ORAL EVERY 12 HOURS
Status: DISCONTINUED | OUTPATIENT
Start: 2022-04-15 | End: 2022-04-15

## 2022-04-15 RX ORDER — SODIUM CHLORIDE 0.9 % (FLUSH) 0.9 %
5-40 SYRINGE (ML) INJECTION EVERY 8 HOURS
Status: DISCONTINUED | OUTPATIENT
Start: 2022-04-15 | End: 2022-04-20

## 2022-04-15 RX ORDER — LEVETIRACETAM 250 MG/1
750 TABLET ORAL 2 TIMES DAILY
Status: DISCONTINUED | OUTPATIENT
Start: 2022-04-15 | End: 2022-04-20

## 2022-04-15 RX ADMIN — SODIUM CHLORIDE, PRESERVATIVE FREE 10 ML: 5 INJECTION INTRAVENOUS at 22:34

## 2022-04-15 RX ADMIN — POTASSIUM CHLORIDE 10 MEQ: 7.46 INJECTION, SOLUTION INTRAVENOUS at 12:00

## 2022-04-15 RX ADMIN — POTASSIUM CHLORIDE 40 MEQ: 750 TABLET, FILM COATED, EXTENDED RELEASE ORAL at 16:41

## 2022-04-15 RX ADMIN — AMPICILLIN SODIUM AND SULBACTAM SODIUM 3 G: 2; 1 INJECTION, POWDER, FOR SOLUTION INTRAMUSCULAR; INTRAVENOUS at 06:09

## 2022-04-15 RX ADMIN — OXYCODONE AND ACETAMINOPHEN 1 TABLET: 5; 325 TABLET ORAL at 05:17

## 2022-04-15 RX ADMIN — BETAMETHASONE SODIUM PHOSPHATE AND BETAMETHASONE ACETATE 12 MG: 3; 3 INJECTION, SUSPENSION INTRA-ARTICULAR; INTRALESIONAL; INTRAMUSCULAR at 21:23

## 2022-04-15 RX ADMIN — AMPICILLIN SODIUM AND SULBACTAM SODIUM 3 G: 2; 1 INJECTION, POWDER, FOR SOLUTION INTRAMUSCULAR; INTRAVENOUS at 23:38

## 2022-04-15 RX ADMIN — AMPICILLIN SODIUM AND SULBACTAM SODIUM 3 G: 2; 1 INJECTION, POWDER, FOR SOLUTION INTRAMUSCULAR; INTRAVENOUS at 17:38

## 2022-04-15 RX ADMIN — POTASSIUM CHLORIDE 40 MEQ: 750 TABLET, FILM COATED, EXTENDED RELEASE ORAL at 05:16

## 2022-04-15 RX ADMIN — LEVETIRACETAM 750 MG: 250 TABLET, FILM COATED ORAL at 23:31

## 2022-04-15 RX ADMIN — POTASSIUM CHLORIDE 40 MEQ: 750 TABLET, FILM COATED, EXTENDED RELEASE ORAL at 22:31

## 2022-04-15 RX ADMIN — SODIUM CHLORIDE, PRESERVATIVE FREE 10 ML: 5 INJECTION INTRAVENOUS at 13:12

## 2022-04-15 RX ADMIN — OXYCODONE AND ACETAMINOPHEN 1 TABLET: 5; 325 TABLET ORAL at 11:57

## 2022-04-15 RX ADMIN — POTASSIUM CHLORIDE 10 MEQ: 7.46 INJECTION, SOLUTION INTRAVENOUS at 14:24

## 2022-04-15 RX ADMIN — LEVETIRACETAM 750 MG: 250 TABLET, FILM COATED ORAL at 09:12

## 2022-04-15 RX ADMIN — ACETAMINOPHEN 650 MG: 325 TABLET ORAL at 21:18

## 2022-04-15 RX ADMIN — NIFEDIPINE 10 MG: 10 CAPSULE ORAL at 06:13

## 2022-04-15 RX ADMIN — OXYCODONE AND ACETAMINOPHEN 1 TABLET: 325; 5 TABLET ORAL at 22:38

## 2022-04-15 RX ADMIN — POTASSIUM CHLORIDE 10 MEQ: 7.46 INJECTION, SOLUTION INTRAVENOUS at 15:30

## 2022-04-15 RX ADMIN — LABETALOL HYDROCHLORIDE 200 MG: 100 TABLET, FILM COATED ORAL at 02:35

## 2022-04-15 RX ADMIN — ACETAMINOPHEN 500 MG: 500 TABLET ORAL at 00:31

## 2022-04-15 RX ADMIN — POTASSIUM CHLORIDE 10 MEQ: 7.46 INJECTION, SOLUTION INTRAVENOUS at 13:10

## 2022-04-15 RX ADMIN — POTASSIUM CHLORIDE 40 MEQ: 750 TABLET, FILM COATED, EXTENDED RELEASE ORAL at 12:40

## 2022-04-15 RX ADMIN — POTASSIUM CHLORIDE 10 MEQ: 7.46 INJECTION, SOLUTION INTRAVENOUS at 06:30

## 2022-04-15 RX ADMIN — POTASSIUM CHLORIDE 10 MEQ: 7.46 INJECTION, SOLUTION INTRAVENOUS at 07:32

## 2022-04-15 RX ADMIN — LABETALOL HYDROCHLORIDE 400 MG: 200 TABLET, FILM COATED ORAL at 06:14

## 2022-04-15 RX ADMIN — ZOLPIDEM TARTRATE 5 MG: 5 TABLET ORAL at 23:33

## 2022-04-15 RX ADMIN — POTASSIUM CHLORIDE 10 MEQ: 7.46 INJECTION, SOLUTION INTRAVENOUS at 09:10

## 2022-04-15 RX ADMIN — OXYCODONE AND ACETAMINOPHEN 1 TABLET: 5; 325 TABLET ORAL at 17:38

## 2022-04-15 RX ADMIN — AMPICILLIN SODIUM AND SULBACTAM SODIUM 3 G: 2; 1 INJECTION, POWDER, FOR SOLUTION INTRAMUSCULAR; INTRAVENOUS at 12:41

## 2022-04-15 NOTE — PROGRESS NOTES
[place] [facility] Observation Notice (Brent Torres) provided to patient/representative with verbal explanation of the notice. Time allotted for questions regarding the notice. Patient /representative provided a completed copy of the MOON/VOON notice. Copy placed on bedside chart.

## 2022-04-15 NOTE — PROGRESS NOTES
TRANSFER - OUT REPORT:    Verbal report given to Mis Dyer RN at  on [employer]  being transferred to L&D for routine progression of care. Report consisted of patients Situation, Background, Assessment and   Recommendations(SBAR). Information from the following report(s) SBAR, MAR, Recent Results and Cardiac Rhythm NSR with intermittent Sinus Tach was reviewed with the receiving nurse. Lines:   Peripheral IV 04/14/22 Right Antecubital (Active)   Site Assessment Clean, dry, & intact 04/15/22 1644   Phlebitis Assessment 0 04/15/22 1644   Infiltration Assessment 0 04/15/22 1644   Dressing Status Clean, dry, & intact 04/15/22 1644   Dressing Type Transparent 04/15/22 1644   Hub Color/Line Status Capped;Flushed 04/15/22 1644   Alcohol Cap Used Yes 04/15/22 1644       Peripheral IV 04/15/22 Anterior;Distal;Right Forearm (Active)   Site Assessment Clean, dry, & intact 04/15/22 1644   Phlebitis Assessment 0 04/15/22 1644   Infiltration Assessment 0 04/15/22 1644   Dressing Status Clean, dry, & intact 04/15/22 1644   Dressing Type Transparent 04/15/22 1644   Hub Color/Line Status Capped;Flushed 04/15/22 1644   Alcohol Cap Used Yes 04/15/22 1644      Vitals:        Temp: 97.4 °F (36.3 °C)  Temp Source: Oral  Pulse (Heart Rate): 99  Heart Rate Source: Monitor  Level of Consciousness: Alert (0)  BP: (!) 147/100  MAP (Calculated): 116  BP 1 Location: Left upper arm  BP 1 Method: Automatic  BP Patient Position: Semi fowlers,At rest  Resp Rate: 18  O2 Sat (%): 100 %  O2 Device: None (Room air)  MEWS Score: 1      Opportunity for questions and clarification was provided. Patient transported with:   Patient-specific medications from Pharmacy  Registered Nurse  Tech    Pt shows no signs of distress. Vital signs stable.

## 2022-04-15 NOTE — CONSULTS
Obstetrics consult    Name: Mary Knapp MRN: [id number]  SSN: [id number]    YOB: 1985  Age: 39 y.o. Sex: female      Subjective:     Reason for Admission: 25 week pregnant female with dental abscess, hypokalemia, and hypertension    History of Present Illness: Ms. Zac Monaco is a 39 y.o.  female with unknown BHAVYA, pt states she is 22-23 weeks with due date in 2022. She presented to the [facility] ER due to dental pain ongoing x a few days. Patient states she has started self-medicating with her family member's clindamycin. In the ER patient was noted to be hypertensive. She is unaware of diagnosis of hypertension outside of pregnancy. She has been seen once during this pregnancy few months back by Dr. You Jama. She states she did not have regular prenatal care as she was initially unsure if she wanted to proceed with the pregnancy. She was diagnosed with a dental abscess in the ED. Attempt at I&D produced scant bloody drainage. Her labs showed microcytic anemia, hypokalemia and p:c 0.5. She was thus transferred to [address] [place] [address] team for further management with plan for OB/GYN consult. She received IV labetalol 20 mg x 1 dose while at the [facility] ER her blood pressures remained mild to severe range. Recommendation was made by me to start p.o. labetalol which she received one time dose of 200mg prior to transfer, and 400mg po this am.  She has toothache and headache. She states she was having some blurry vision few days earlier. She denies chest pain or shortness of breath or abdominal pain or nausea or vomiting. Positive fetal movement. Denies leaking of fluid or vaginal bleeding or contractions.                               OB History    Para Term  AB Living   4         3   SAB IAB Ectopic Molar Multiple Live Births             3      # Outcome Date GA Lbr Ye/2nd Weight Sex Delivery Anes PTL Lv   4 Current            3  2             1 Sally              Past Medical History:   Diagnosis Date    Anemia     Epilepsy ([facility] [address]     Hernia     Seizure ([facility] [address]      History reviewed. No pertinent surgical history. Social History     Occupational History    Not on file   Tobacco Use    Smoking status: Current Every Day Smoker     Packs/day: 0.25    Smokeless tobacco: Never Used   Substance and Sexual Activity    Alcohol use: Yes     Comment: rarely    Drug use: No    Sexual activity: Yes      Family History   Problem Relation Age of Onset    Other Mother         growth/tumor on brain    Migraines Mother     Hypertension Mother        Allergies   Allergen Reactions    Codeine Hives    Sulfa (Sulfonamide Antibiotics) Hives     Prior to Admission medications    Medication Sig Start Date End Date Taking? Authorizing Provider   penicillin v potassium (VEETID) 500 mg tablet Take 1 Tablet by mouth four (4) times daily for 7 days. 22 Yes Zakiya Lugo D, DO   levETIRAcetam (KEPPRA) 750 mg tablet Take 1 Tab by mouth two (2) times a day. 17  Yes Simeon Rivera Q, NP   zolpidem (AMBIEN) 5 mg tablet Take 1 Tab by mouth nightly as needed for Sleep. Max Daily Amount: 5 mg. Patient not taking: Reported on 2022 3/20/17   Jaxon Borjas NP   ALPRAZolam Clarsandra Jaegers) 0.25 mg tablet Take 1 Tab by mouth two (2) times daily as needed for Anxiety. Max Daily Amount: 0.5 mg. Patient not taking: Reported on 2022   Jaxon Borjas NP   gabapentin (NEURONTIN) 300 mg capsule Take 1 Cap by mouth two (2) times daily as needed.   Patient not taking: Reported on 16   Jaxon Borjas NP        Review of Systems:  Constitutional: negative for fevers and chills  Eyes: positive for resolved blurry vision  Ears, Nose, Mouth, Throat, and Face: positive for earaches  Respiratory: negative for cough, pleurisy/chest pain or wheezing  Cardiovascular: negative for chest pain, chest pressure/discomfort, palpitations, syncope  Gastrointestinal: negative for nausea, vomiting, diarrhea and constipation  Genitourinary:negative for frequency and dysuria  Musculoskeletal:negative for myalgias and muscle weakness     Objective:     Vitals:    Vitals:    04/15/22 0930 04/15/22 0945 04/15/22 1006 04/15/22 1015   BP: 126/89 124/87 122/85 121/84   Pulse: 98 95 96 98   Resp: 18 18 18 18   Temp:       SpO2: 100% 100% 100% 100%   Weight:       Height:          Temp (24hrs), Av.3 °F (36.8 °C), Min:97.9 °F (36.6 °C), Max:98.6 °F (37 °C)    BP  Min: 121/84  Max: 178/113     Physical Exam:  Patient without distress.   Lung: normal respiratory effort  Abdomen: soft, nontender, without guarding, gravid  : cervix closed/th/high  Lower Extremities: No Edema, No evidence of DVT seen on physical exam.     Normal fetal heart tones per nursing staff  Psych: mood appropriate        Current Facility-Administered Medications:     levETIRAcetam (KEPPRA) tablet 750 mg, 750 mg, Oral, BID, Candy Lopez MD, 750 mg at 04/15/22 0912    sodium chloride (NS) flush 5-40 mL, 5-40 mL, IntraVENous, Q8H, Candy Lopez MD    sodium chloride (NS) flush 5-40 mL, 5-40 mL, IntraVENous, PRN, Candy Lopez MD    acetaminophen (TYLENOL) tablet 650 mg, 650 mg, Oral, Q6H PRN **OR** acetaminophen (TYLENOL) suppository 650 mg, 650 mg, Rectal, Q6H PRN, Candy Lopez MD    polyethylene glycol (MIRALAX) packet 17 g, 17 g, Oral, DAILY PRN, Candy Lopez MD    ondansetron (ZOFRAN ODT) tablet 4 mg, 4 mg, Oral, Q8H PRN **OR** ondansetron (ZOFRAN) injection 4 mg, 4 mg, IntraVENous, Q6H PRN, Candy Lopez MD    labetaloL (NORMODYNE;TRANDATE) injection 10 mg, 10 mg, IntraVENous, Q1H PRN, Vaishali Holt MD    ampicillin-sulbactam (UNASYN) 3 g in 0.9% sodium chloride (MBP/ADV) 100 mL MBP, 3 g, IntraVENous, Q6H, Candy Lopez MD, Last Rate: 200 mL/hr at 04/15/22 06, 3 g at 04/15/22 06    labetaloL (NORMODYNE) tablet 400 mg, 400 mg, Oral, Q12H, Liza Tierney MD, 400 mg at 04/15/22 1452    oxyCODONE-acetaminophen (PERCOCET) 5-325 mg per tablet 1 Tablet, 1 Tablet, Oral, Q6H PRN, Samantha Conte MD, 1 Tablet at 04/15/22 0517    Lab/Data Review:  Recent Results (from the past 24 hour(s))   CBC WITH AUTOMATED DIFF    Collection Time: 04/14/22  9:45 PM   Result Value Ref Range    WBC 4.7 3.6 - 11.0 K/uL    RBC 3.19 (L) 3.80 - 5.20 M/uL    HGB 7.3 (L) 11.5 - 16.0 g/dL    HCT 24.5 (L) 35.0 - 47.0 %    MCV 76.8 (L) 80.0 - 99.0 FL    MCH 22.9 (L) 26.0 - 34.0 PG    MCHC 29.8 (L) 30.0 - 36.5 g/dL    RDW 17.8 (H) 11.5 - 14.5 %    PLATELET 953 025 - 446 K/uL    MPV 8.4 (L) 8.9 - 12.9 FL    NEUTROPHILS 52 32 - 75 %    LYMPHOCYTES 40 12 - 49 %    MONOCYTES 6 5 - 13 %    EOSINOPHILS 0 0 - 7 %    BASOPHILS 1 0 - 1 %    IMMATURE GRANULOCYTES 1 (H) 0.0 - 0.5 %    ABS. NEUTROPHILS 2.5 1.8 - 8.0 K/UL    ABS. LYMPHOCYTES 1.9 0.8 - 3.5 K/UL    ABS. MONOCYTES 0.3 0.0 - 1.0 K/UL    ABS. EOSINOPHILS 0.0 0.0 - 0.4 K/UL    ABS. BASOPHILS 0.0 0.0 - 0.1 K/UL    ABS. IMM. GRANS. 0.0 0.00 - 0.04 K/UL    DF AUTOMATED     METABOLIC PANEL, COMPREHENSIVE    Collection Time: 04/14/22  9:45 PM   Result Value Ref Range    Sodium 142 136 - 145 mmol/L    Potassium 2.5 (LL) 3.5 - 5.1 mmol/L    Chloride 102 97 - 108 mmol/L    CO2 23 21 - 32 mmol/L    Anion gap 17 (H) 5 - 15 mmol/L    Glucose 88 65 - 100 mg/dL    BUN 10 6 - 20 mg/dL    Creatinine 0.65 0.55 - 1.02 mg/dL    BUN/Creatinine ratio 15 12 - 20      GFR est AA >60 >60 ml/min/1.73m2    GFR est non-AA >60 >60 ml/min/1.73m2    Calcium 8.3 (L) 8.5 - 10.1 mg/dL    Bilirubin, total 0.4 0.2 - 1.0 mg/dL    AST (SGOT) 18 15 - 37 U/L    ALT (SGPT) 14 12 - 78 U/L    Alk.  phosphatase 157 (H) 45 - 117 U/L    Protein, total 7.0 6.4 - 8.2 g/dL    Albumin 2.9 (L) 3.5 - 5.0 g/dL    Globulin 4.1 (H) 2.0 - 4.0 g/dL    A-G Ratio 0.7 (L) 1.1 - 2.2     PROTEIN/CREATININE RATIO, URINE    Collection Time: 04/14/22  9:45 PM   Result Value Ref Range    Protein, urine random 77 (H) 0.0 - 11.9 mg/dL    Creatinine, urine 169.00 mg/dL    Protein/Creat. urine Ratio 0.5     URINALYSIS W/ RFLX MICROSCOPIC    Collection Time: 04/14/22  9:45 PM   Result Value Ref Range    Color Yellow      Appearance Clear Clear      Specific gravity 1.010 1.003 - 1.030      pH (UA) 7.0 5.0 - 8.0      Protein Negative Negative mg/dL    Glucose Negative Negative mg/dL    Ketone 15 (A) Negative mg/dL    Bilirubin Negative Negative      Blood Negative Negative      Urobilinogen 0.1 (L) 0.2 - 1.0 EU/dL    Nitrites Negative Negative      Leukocyte Esterase Negative Negative      WBC 0-4 0 - 4 /hpf    RBC 0-5 0 - 5 /hpf    Epithelial cells Few Few /lpf    Bacteria 1+ (A) Negative /hpf   LD    Collection Time: 04/14/22 10:00 PM   Result Value Ref Range     81 - 246 U/L   PROTHROMBIN TIME + INR    Collection Time: 04/14/22 10:21 PM   Result Value Ref Range    Prothrombin time 10.1 9.0 - 11.1 sec    INR 1.0 0.9 - 1.1     TYPE & SCREEN    Collection Time: 04/14/22 10:23 PM   Result Value Ref Range    Crossmatch Expiration 04/17/2022,2359     ABO/Rh(D) Elicia Locks Positive     Antibody screen Negative    MRSA SCREEN - PCR (NASAL)    Collection Time: 04/15/22 12:00 AM   Result Value Ref Range    MRSA by PCR, Nasal Not Detected     RETICULOCYTE COUNT    Collection Time: 04/15/22  6:27 AM   Result Value Ref Range    Reticulocyte count 1.9 0.7 - 2.1 %    Absolute Retic Cnt. 0.0564 0.0164 - 0.0776 M/ul   LD    Collection Time: 04/15/22  6:27 AM   Result Value Ref Range     81 - 246 U/L       Assessment and Plan: Active Problems:    Hypokalemia (4/14/2022)      Symptomatic anemia (4/14/2022)      Elevated blood pressure affecting pregnancy in second trimester, antepartum (4/14/2022)      Hypertension affecting pregnancy in second trimester (4/15/2022)      Dental abscess (4/15/2022)       HTN in pregnancy- could be chronic hypertension vs pregnancy induced hypertension such as pre-eclampsia. Need to obtain ob records from initial prenatal visit to confirm due date and eval if Bps were elevated then as if she was less than 20 weeks that would lean towards chronic hypertension. Discussed it is rare to see preeclampsia with severe features etc. INearly gestation but tox labs drawn and normal except for proteinuria. Proteinuria can still be seen in chronic hypertension so trend labs. Dating scan ordered in the meantime. Need to keep blood pressures less than 928C for systolics or less than 39Q for diastolics. Labetalol 400 mg p.o. every 12 hours ordered and can be adjusted as needed. Recommend transfer to labor and delivery for blood pressure management as floor for unable to push IV antihypertensives. For dental abscess potential antibiotics that can be used in pregnancy reviewed with pharmacy. She is currently on Unasyn- literature review shows can be used if necessary. She can be switched to cephalosporin or penicillin etc for dental abscess as preferred in preg. Discussed with pharmacy. Primary team managing. Ultrasound head and neck soft tissue ordered. Hypokalemia being managed by primary team.     Anemia- per primary team. Microcytic so likely iron related. Discussed can start IV iron while awaiting labs. No need to transfuse unless patient becomes symptomatic or is for imminent delivery and less than Hgb of 7. Pregnancy- confirm gestational date. FHRs q shift if less than 24 weeks otherwise NST daily. Signed out to on call ob provider.

## 2022-04-15 NOTE — PROGRESS NOTES
1908: Bedside shift report received from [employer]. Assume care of pt. Pt alert in bed, reports throbbing to left ear rated pain at 5/10. Pain has improved with percocet. 2005: Dr. Marikay Halsted at nurses station, Bps reviewed with MD, plan of care discussed, to hold labetalol for now and  inform MD if bps 160/110 or greater, for betamethasone injection;  uric acid, and magnesium labs. 2123: Betamethasone 12mg IM given as per order. 2248: Dr. Marikay Halsted updated about pt, new lab results reviewed Bps reviewed, updated re pt's pain and analgesics given. Order received for [employer]. 2300: Pt up re need for stool sample for occult blood. Container provided for sample collection. Pt verbalized understanding. 0130: Resting quietly in bed    0605: Sample for CBC and CMP collected and sent to lab. Pt has had no bowel movement thus far.      0710: Bedside shift report given to [employer]

## 2022-04-15 NOTE — DISCHARGE INSTRUCTIONS
High Blood Pressure: Care Instructions  Overview     It's normal for blood pressure to go up and down throughout the day. But if it stays up, you have high blood pressure. Another name for high blood pressure is hypertension. Despite what a lot of people think, high blood pressure usually doesn't cause headaches or make you feel dizzy or lightheaded. It usually has no symptoms. But it does increase your risk of stroke, heart attack, and other problems. You and your doctor will talk about your risks of these problems based on your blood pressure. Your doctor will give you a goal for your blood pressure. Your goal will be based on your health and your age. Lifestyle changes, such as eating healthy and being active, are always important to help lower blood pressure. You might also take medicine to reach your blood pressure goal.  Follow-up care is a key part of your treatment and safety. Be sure to make and go to all appointments, and call your doctor if you are having problems. It's also a good idea to know your test results and keep a list of the medicines you take. How can you care for yourself at home? Medical treatment  · If you stop taking your medicine, your blood pressure will go back up. You may take one or more types of medicine to lower your blood pressure. Be safe with medicines. Take your medicine exactly as prescribed. Call your doctor if you think you are having a problem with your medicine. · Talk to your doctor before you start taking aspirin every day. Aspirin can help certain people lower their risk of a heart attack or stroke. But taking aspirin isn't right for everyone, because it can cause serious bleeding. · See your doctor regularly. You may need to see the doctor more often at first or until your blood pressure comes down. · If you are taking blood pressure medicine, talk to your doctor before you take decongestants or anti-inflammatory medicine, such as ibuprofen.  Some of these medicines can raise blood pressure. · Learn how to check your blood pressure at home. Lifestyle changes  · Stay at a healthy weight. This is especially important if you put on weight around the waist. Losing even 10 pounds can help you lower your blood pressure. · If your doctor recommends it, get more exercise. Walking is a good choice. Bit by bit, increase the amount you walk every day. Try for at least 30 minutes on most days of the week. You also may want to swim, bike, or do other activities. · Avoid or limit alcohol. Talk to your doctor about whether you can drink any alcohol. · Try to limit how much sodium you eat to less than 2,300 milligrams (mg) a day. Your doctor may ask you to try to eat less than 1,500 mg a day. · Eat plenty of fruits (such as bananas and oranges), vegetables, legumes, whole grains, and low-fat dairy products. · Lower the amount of saturated fat in your diet. Saturated fat is found in animal products such as milk, cheese, and meat. Limiting these foods may help you lose weight and also lower your risk for heart disease. · Do not smoke. Smoking increases your risk for heart attack and stroke. If you need help quitting, talk to your doctor about stop-smoking programs and medicines. These can increase your chances of quitting for good. When should you call for help? Call 911  anytime you think you may need emergency care. This may mean having symptoms that suggest that your blood pressure is causing a serious heart or blood vessel problem. Your blood pressure may be over 180/120. For example, call 911 if:    · You have symptoms of a heart attack. These may include:  ? Chest pain or pressure, or a strange feeling in the chest.  ? Sweating. ? Shortness of breath. ? Nausea or vomiting. ? Pain, pressure, or a strange feeling in the back, neck, jaw, or upper belly or in one or both shoulders or arms. ? Lightheadedness or sudden weakness.   ? A fast or irregular heartbeat.     · You have symptoms of a stroke. These may include:  ? Sudden numbness, tingling, weakness, or loss of movement in your face, arm, or leg, especially on only one side of your body. ? Sudden vision changes. ? Sudden trouble speaking. ? Sudden confusion or trouble understanding simple statements. ? Sudden problems with walking or balance. ? A sudden, severe headache that is different from past headaches.     · You have severe back or belly pain. Do not wait until your blood pressure comes down on its own. Get help right away. Call your doctor now or seek immediate care if:    · Your blood pressure is much higher than normal (such as 180/120 or higher), but you don't have symptoms.     · You think high blood pressure is causing symptoms, such as:  ? Severe headache.  ? Blurry vision. Watch closely for changes in your health, and be sure to contact your doctor if:    · Your blood pressure measures higher than your doctor recommends at least 2 times. That means the top number is higher or the bottom number is higher, or both.     · You think you may be having side effects from your blood pressure medicine. Where can you learn more? Go to http://www.gray.com/  Enter [id number] in the search box to learn more about \"High Blood Pressure: Care Instructions. \"  Current as of: January 10, 2022               Content Version: 13.2   [employer]. Care instructions adapted under license by [employer] (which disclaims liability or warranty for this information). If you have questions about a medical condition or this instruction, always ask your healthcare professional. [place] [address] any warranty or liability for your use of this information. After Your Delivery (the Postpartum Period): Care Instructions  Overview     Congratulations on the birth of your baby.  Like pregnancy, the  period can be a time of excitement, esteban, and exhaustion. You may look at your wondrous little baby and feel happy. You may also be overwhelmed by your new sleep hours and new responsibilities. At first, babies often sleep during the days and are awake at night. They do not have a pattern or routine. They may make sudden gasps, jerk themselves awake, or look like they have crossed eyes. These are all normal, and they may even make you smile. In these first weeks after delivery, try to take good care of yourself. It may take 4 to 6 weeks to feel like yourself again, and possibly longer if you had a  birth. You will likely feel very tired for several weeks. Your days will be full of ups and downs, but lots of esteban as well. Follow-up care is a key part of your treatment and safety. Be sure to make and go to all appointments, and call your doctor if you are having problems. It's also a good idea to know your test results and keep a list of the medicines you take. How can you care for yourself at home? Take care of your body after delivery  · Use pads instead of tampons for the bloody flow that may last as long as 2 weeks. · Ease cramps with ibuprofen (Advil, Motrin). · Ease soreness of hemorrhoids and the area between your vagina and rectum with ice compresses or witch hazel pads. · Ease constipation by drinking lots of fluid and eating high-fiber foods. Ask your doctor about over-the-counter stool softeners. · Cleanse yourself with a gentle squeeze of warm water from a bottle instead of wiping with toilet paper. · Take a sitz bath in warm water several times a day. · Wear a good nursing bra. Ease sore and swollen breasts with warm, wet washcloths. · If you aren't breastfeeding, use ice rather than heat for breast soreness. · Your period may not start for several months if you are breastfeeding. You may bleed more, and longer at first, than you did before you got pregnant. · Wait until you are healed (about 4 to 6 weeks) before you have sex. Ask your doctor when it is okay for you to have sex. · Try not to travel with your baby for 5 or 6 weeks. If you take a long car trip, make frequent stops to walk around and stretch. Avoid exhaustion  · Rest every day. Try to nap when your baby naps. · Ask another adult to be with you for a few days after delivery. · Plan for  if you have other children. · Stay flexible so you can eat at odd hours and sleep when you need to. Both you and your baby are making new schedules. · Plan small trips to get out of the house. Change can make you feel less tired. · Ask for help with housework, cooking, and shopping. Remind yourself that your job is to care for your baby. Know about help for postpartum depression  · \"Baby blues\" are common for the first 1 to 2 weeks after birth. You may cry or feel sad or irritable for no reason. · Rest whenever you can. Being tired makes it harder to handle your emotions. · Go for walks with your baby. · Talk to your partner, friends, and family about your feelings. · If your symptoms last for more than a few weeks, or if you feel very depressed, ask your doctor for help. · Postpartum depression can be treated. Support groups and counseling can help. Sometimes medicine can also help. Stay healthy  · Eat healthy foods so you have more energy. · If you breastfeed, avoid drugs. If you quit smoking during pregnancy, try to stay smoke-free. If you choose to have a drink now and then, have only one drink, and limit the number of occasions that you have a drink. Wait to breastfeed at least 2 hours after you have a drink to reduce the amount of alcohol the baby may get in the milk. · Start daily exercise after 4 to 6 weeks, but rest when you feel tired. · Learn exercises to tone your belly. Do Kegel exercises to regain strength in your pelvic muscles. You can do these exercises while you stand or sit. ? Squeeze the same muscles you would use to stop your urine.  Your belly and thighs should not move. ? Hold the squeeze for 3 seconds, and then relax for 3 seconds. ? Start with 3 seconds. Then add 1 second each week until you are able to squeeze for 10 seconds. ? Repeat the exercise 10 to 15 times for each session. Do three or more sessions each day. · Find a class for you and your baby that has an exercise time. · If you had a  birth, give yourself a bit more time before you exercise, and be careful. When should you call for help? Share this information with your partner, family, or a friend. They can help you watch for warning signs. Call 911  anytime you think you may need emergency care. For example, call if:    · You have thoughts of harming yourself, your baby, or another person.     · You passed out (lost consciousness).     · You have chest pain, are short of breath, or cough up blood.     · You have a seizure. Call your doctor now or seek immediate medical care if:    · You have signs of hemorrhage (too much bleeding), such as:  ? Heavy vaginal bleeding. This means that you are soaking through one or more pads in an hour. Or you pass blood clots bigger than an egg. ? Feeling dizzy or lightheaded, or you feel like you may faint. ? Feeling so tired or weak that you cannot do your usual activities. ? A fast or irregular heartbeat. ? New or worse belly pain.     · You have signs of infection, such as:  ? A fever. ? Vaginal discharge that smells bad.  ? New or worse belly pain.     · You have symptoms of a blood clot in your leg (called a deep vein thrombosis), such as:  ? Pain in the calf, back of the knee, thigh, or groin. ? Redness and swelling in your leg or groin.     · You have signs of preeclampsia, such as:  ? Sudden swelling of your face, hands, or feet. ? New vision problems (such as dimness, blurring, or seeing spots). ? A severe headache.    Watch closely for changes in your health, and be sure to contact your doctor if:    · Your vaginal bleeding isn't decreasing.     · You feel sad, anxious, or hopeless for more than a few days.     · You are having problems with your breasts or breastfeeding. Where can you learn more? Go to http://www.sibley.com/  Enter A461 in the search box to learn more about \"After Your Delivery (the Postpartum Period): Care Instructions. \"  Current as of: June 16, 2021               Content Version: 13.2  © 2006-2022 [employer]. Care instructions adapted under license by [employer] (which disclaims liability or warranty for this information). If you have questions about a medical condition or this instruction, always ask your healthcare professional. [place] [address] any warranty or liability for your use of this information. Substance Use and Pregnancy: Care Instructions  Your Care Instructions     When you are pregnant, each thing that you eat, drink, or take into your body may affect your unborn baby. So try to eat healthy foods and drink lots of water while you are pregnant. Do not drink alcohol or use drugs, such as marijuana, meth, cocaine, or heroin. Even a little alcohol or drug use can hurt your baby. Smoking can also slow your baby's growth. Alcohol and drug use during pregnancy can cause problems in your child that can last for his or her whole life. If you are thinking about getting pregnant, stop drinking alcohol and do not use drugs or smoke. You may not know when you get pregnant. Follow-up care is a key part of your treatment and safety. Be sure to make and go to all appointments, and call your doctor if you are having problems. It's also a good idea to know your test results and keep a list of the medicines you take. How can you care for yourself at home? · Stop drinking alcohol. Tell your doctor if you need help to quit. Counseling, support groups, and sometimes medicines can help you stay sober.  If you have a history of problems with quitting, tell your doctor. A person who drinks most of the day, starting in the morning, for example, may need medical help to safely quit drinking. · Do not smoke or allow others to smoke around you. If you need help quitting, talk to your doctor about stop-smoking programs and medicines. These can increase your chances of quitting for good. · Eat a variety of foods to get all the nutrients you need. · Drink lots of water every day. This can help reduce premature contractions. · Take a daily vitamin that has folic acid. A vitamin meant for pregnant women can help prevent birth defects. · Increase the calcium in your diet. Get 4 or more servings of milk and milk products each day. Good choices include nonfat or low-fat milk, yogurt, and cheese. If you cannot eat milk products, you can get calcium from calcium-fortified products such as orange juice, soy milk, and tofu. Other sources of calcium include leafy green vegetables such as broccoli, kale, mustard greens, turnip greens, bok anatoly, and brussels sprouts. · Limit the amount of caffeine you get. Coffee, tea, cola, and chocolate all have caffeine. · Get regular exercise during pregnancy. Try to get 30 minutes on most days of the week. Walking and swimming are good exercises during pregnancy. · Use acetaminophen (Tylenol) to relieve minor problems, such as a mild headache or backache or a mild fever with cold symptoms. Do not use nonsteroidal anti-inflammatory drugs (NSAIDs), such as any type of aspirin product, ibuprofen (Advil, Motrin), or naproxen (Aleve), unless your doctor says it is okay. Do not take any other medicine unless your doctor says it is okay. · Go to all scheduled doctor visits while you are pregnant. When should you call for help? Watch closely for changes in your health, and be sure to contact your doctor if:    · You need help with drug or alcohol problems. Where can you learn more?   Go to http://www.[employer].com/  Enter [id number] in the search box to learn more about \"Substance Use and Pregnancy: Care Instructions. \"  Current as of: June 16, 2021               Content Version: 13.2  © 7700-5669 [employer]. Care instructions adapted under license by [employer] (which disclaims liability or warranty for this information). If you have questions about a medical condition or this instruction, always ask your healthcare professional. [address] any warranty or liability for your use of this information. Depression After Childbirth: Care Instructions  Overview     Many women get the \"baby blues\" during the first few days after childbirth. You may lose sleep, feel irritable, and cry easily. You may feel happy one minute and sad the next. Hormone changes are one cause of these emotional changes. Also, the demands of a new baby, along with visits from relatives or other family needs, can add to the stress. The \"baby blues\" often peak around the fourth day. Then they ease up in less than 2 weeks. If your moodiness or anxiety lasts for more than 2 weeks, or if you feel like life is not worth living, you may have postpartum depression. This is different for each person. Some mothers with serious depression may worry intensely about their infant's well-being. Others may feel distant from their child. Some mothers may even feel that they might harm their baby. Some may have signs of paranoia, wondering if someone is watching them. Depression is not a sign of weakness. It's a medical condition that requires treatment. Medicine and counseling often work well to reduce depression. Talk to your doctor about taking antidepressant medicine while breastfeeding. Follow-up care is a key part of your treatment and safety. Be sure to make and go to all appointments, and call your doctor if you are having problems.  It's also a good idea to know your test results and keep a list of the medicines you take. How do you know if you are depressed? With all the changes in your life, you may not know if you are depressed. Pregnancy sometimes causes changes in how you feel that are similar to the symptoms of depression. Symptoms of depression include:  · Feeling sad or hopeless and losing interest in daily activities. These are the most common symptoms of depression. · Sleeping too much or not enough. · Feeling tired. You may feel as if you have no energy. · Eating too much or too little. · Writing or talking about death, such as writing suicide notes or talking about guns, knives, or pills. Keep the numbers for these national suicide hotlines: 8-136-025-TALK (5-808.217.3515) and 1-257-GKJKOXX (4-184.407.1386). If you or someone you know talks about suicide or feeling hopeless, get help right away. How can you care for yourself at home? · Be safe with medicines. Take your medicines exactly as prescribed. Call your doctor if you think you are having a problem with your medicine. · Eat a healthy diet so that you can keep up your energy. · Get regular daily exercise, such as walks, to help improve your mood. · Get as much sunlight as possible. Keep your shades and curtains open. Get outside as much as you can. · Avoid using alcohol or other substances to feel better. · Get as much rest and sleep as possible. Avoid doing too much. Being too tired can increase depression. · Play stimulating music throughout your day and soothing music at night. · Schedule outings and visits with friends and family. Ask them to call you regularly, so that you don't feel alone. · Ask for help with preparing food and other daily tasks. Family and friends are often happy to help with a . · Be honest with yourself and those who care about you. Tell them about your struggle. · Join a support group of new mothers.  No one can better understand the challenges of caring for a  than other new mothers. · If you feel like life is not worth living or you're feeling hopeless, get help right away. Keep the numbers for these national suicide hotlines: 2-945-751-TALK (3-932.993.8357) and 9-691-MUVTUUT (2-746.221.9124). When should you call for help? Call 911 anytime you think you may need emergency care. For example, call if:    · You feel you cannot stop from hurting yourself, your baby, or someone else. Call your doctor now or seek immediate medical care if:    · You are having trouble caring for yourself or your baby.     · You hear voices. Watch closely for changes in your health, and be sure to contact your doctor if:    · You have problems with your depression medicine.     · You do not get better as expected. Where can you learn more? Go to http://www.gray.com/  Enter [id number] in the search box to learn more about \"Depression After Childbirth: Care Instructions. \"  Current as of: 2021               Content Version: 13.2  © 3345-2859 [employer]. Care instructions adapted under license by [employer] (which disclaims liability or warranty for this information). If you have questions about a medical condition or this instruction, always ask your healthcare professional. [address] any warranty or liability for your use of this information.

## 2022-04-15 NOTE — H&P
History and Physical    Patient: Stuart Baldwin MRN: [id number]  SSN: [id number]    YOB: 1985  Age: 39 y.o. Sex: female      Subjective:      Stuart Baldwin is a 39 y.o. female with PMH of epilepsy, currently at 22w in pregnancy presented with the chief complaint of left jaw pain, associated with a swelling in the left side of oral cavity. She first noted the tender swelling about 2 weeks ago, in which she took her mother's clindamycin, but without symptoms improvement. Otherwise denies fever or discharge from the swelling. In addition, reports generalize weakness, some chills, however denies orthostatic symptoms or worsening fatigue. Also denies hematochezia, melena, hematemesis or hematuria. Reports very tender at left jaw. In the ED, noted to be hypertensive. Case discussed with OB/GYN. Feels preeclampsia unlikely with other reassuring labs. Given labetalol with some BP improvement. Also had the oral abscess partially drained in ED. Also noted hypokalemia, a total of 50 mEq given in ED. On my evaluation, patient still complain of severe left jaw pain. Past Medical History:   Diagnosis Date    Anemia     Epilepsy ([address]     Hernia     Seizure [facility])      History reviewed. No pertinent surgical history. Family History   Problem Relation Age of Onset    Other Mother         growth/tumor on brain    Migraines Mother     Hypertension Mother      Social History     Tobacco Use    Smoking status: Current Every Day Smoker     Packs/day: 0.25    Smokeless tobacco: Never Used   Substance Use Topics    Alcohol use: Yes     Comment: rarely      Prior to Admission medications    Medication Sig Start Date End Date Taking? Authorizing Provider   penicillin v potassium (VEETID) 500 mg tablet Take 1 Tablet by mouth four (4) times daily for 7 days. 4/14/22 4/21/22 Yes Nkechi FORD DO   levETIRAcetam (KEPPRA) 750 mg tablet Take 1 Tab by mouth two (2) times a day.  2/22/17  Yes Miguel Simeon HOLLINS NP   zolpidem (AMBIEN) 5 mg tablet Take 1 Tab by mouth nightly as needed for Sleep. Max Daily Amount: 5 mg. Patient not taking: Reported on 4/14/2022 3/20/17   Othelia Lesches, NP   ALPRAZolam Yolanda White) 0.25 mg tablet Take 1 Tab by mouth two (2) times daily as needed for Anxiety. Max Daily Amount: 0.5 mg. Patient not taking: Reported on 4/14/2022 2/22/17   Othelia Lesches, NP   gabapentin (NEURONTIN) 300 mg capsule Take 1 Cap by mouth two (2) times daily as needed. Patient not taking: Reported on 4/14/2022 11/30/16   Othelia Lesches, NP        Allergies   Allergen Reactions    Codeine Hives    Sulfa (Sulfonamide Antibiotics) Hives       Review of Systems:   Constitutional: No fevers, No chills, No fatigue, No weakness  Eyes: No visual disturbance  Ears, Nose, Mouth, Throat, and Face: No nasal congestion, No sore throat  Respiratory: No cough, No sputum, No wheezing, No SOB  Cardiovascular: No chest pain, No lower extremity edema, No Palpitations   Gastrointestinal: No nausea, No vomiting, No diarrhea, No constipation, No abdominal pain  Genitourinary: No frequency, No dysuria, No hematuria  Integument/Breast: No rash, No skin lesion(s), No dryness  Musculoskeletal: No arthralgias, No neck pain, No back pain  Neurological: No headaches, No dizziness, No confusion,  No seizures  Behavioral/Psychiatric: No anxiety, No depression      Objective:     Vitals:    04/15/22 0216 04/15/22 0308 04/15/22 0331 04/15/22 0426   BP: (!) 163/112 (!) 156/116 (!) 160/108 (!) 178/113   Pulse: (!) 104 (!) 104 100 96   Resp: 18 18 18 18   Temp:  98.3 °F (36.8 °C)  98.4 °F (36.9 °C)   SpO2: 98%  100% 100%   Weight:       Height:            Physical Exam:   General: alert, cooperative, in moderate distress  Eye: conjunctivae/corneas clear. PERRL, EOM's intact. Throat and Neck: swelling noted in left sided oral cavity. Generalize swelling noted on left neck, but abscess not felt from neck/cheel.    Lung: clear to auscultation bilaterally  Heart: regular rate and rhythm,   Abdomen: soft, non-tender. Bowel sounds normal. No masses,  Extremities:  able to move all extremities normal, atraumatic  Skin: Normal.  Neurologic: AOx3. Motor function and sensation grossly intact. Psychiatric: non focal    Recent Results (from the past 24 hour(s))   CBC WITH AUTOMATED DIFF    Collection Time: 04/14/22  9:45 PM   Result Value Ref Range    WBC 4.7 3.6 - 11.0 K/uL    RBC 3.19 (L) 3.80 - 5.20 M/uL    HGB 7.3 (L) 11.5 - 16.0 g/dL    HCT 24.5 (L) 35.0 - 47.0 %    MCV 76.8 (L) 80.0 - 99.0 FL    MCH 22.9 (L) 26.0 - 34.0 PG    MCHC 29.8 (L) 30.0 - 36.5 g/dL    RDW 17.8 (H) 11.5 - 14.5 %    PLATELET 751 080 - 050 K/uL    MPV 8.4 (L) 8.9 - 12.9 FL    NEUTROPHILS 52 32 - 75 %    LYMPHOCYTES 40 12 - 49 %    MONOCYTES 6 5 - 13 %    EOSINOPHILS 0 0 - 7 %    BASOPHILS 1 0 - 1 %    IMMATURE GRANULOCYTES 1 (H) 0.0 - 0.5 %    ABS. NEUTROPHILS 2.5 1.8 - 8.0 K/UL    ABS. LYMPHOCYTES 1.9 0.8 - 3.5 K/UL    ABS. MONOCYTES 0.3 0.0 - 1.0 K/UL    ABS. EOSINOPHILS 0.0 0.0 - 0.4 K/UL    ABS. BASOPHILS 0.0 0.0 - 0.1 K/UL    ABS. IMM. GRANS. 0.0 0.00 - 0.04 K/UL    DF AUTOMATED     METABOLIC PANEL, COMPREHENSIVE    Collection Time: 04/14/22  9:45 PM   Result Value Ref Range    Sodium 142 136 - 145 mmol/L    Potassium 2.5 (LL) 3.5 - 5.1 mmol/L    Chloride 102 97 - 108 mmol/L    CO2 23 21 - 32 mmol/L    Anion gap 17 (H) 5 - 15 mmol/L    Glucose 88 65 - 100 mg/dL    BUN 10 6 - 20 mg/dL    Creatinine 0.65 0.55 - 1.02 mg/dL    BUN/Creatinine ratio 15 12 - 20      GFR est AA >60 >60 ml/min/1.73m2    GFR est non-AA >60 >60 ml/min/1.73m2    Calcium 8.3 (L) 8.5 - 10.1 mg/dL    Bilirubin, total 0.4 0.2 - 1.0 mg/dL    AST (SGOT) 18 15 - 37 U/L    ALT (SGPT) 14 12 - 78 U/L    Alk.  phosphatase 157 (H) 45 - 117 U/L    Protein, total 7.0 6.4 - 8.2 g/dL    Albumin 2.9 (L) 3.5 - 5.0 g/dL    Globulin 4.1 (H) 2.0 - 4.0 g/dL    A-G Ratio 0.7 (L) 1.1 - 2.2     PROTEIN/CREATININE RATIO, URINE    Collection Time: 04/14/22  9:45 PM   Result Value Ref Range    Protein, urine random 77 (H) 0.0 - 11.9 mg/dL    Creatinine, urine 169.00 mg/dL    Protein/Creat. urine Ratio 0.5     URINALYSIS W/ RFLX MICROSCOPIC    Collection Time: 04/14/22  9:45 PM   Result Value Ref Range    Color Yellow      Appearance Clear Clear      Specific gravity 1.010 1.003 - 1.030      pH (UA) 7.0 5.0 - 8.0      Protein Negative Negative mg/dL    Glucose Negative Negative mg/dL    Ketone 15 (A) Negative mg/dL    Bilirubin Negative Negative      Blood Negative Negative      Urobilinogen 0.1 (L) 0.2 - 1.0 EU/dL    Nitrites Negative Negative      Leukocyte Esterase Negative Negative      WBC 0-4 0 - 4 /hpf    RBC 0-5 0 - 5 /hpf    Epithelial cells Few Few /lpf    Bacteria 1+ (A) Negative /hpf   LD    Collection Time: 04/14/22 10:00 PM   Result Value Ref Range     81 - 246 U/L   PROTHROMBIN TIME + INR    Collection Time: 04/14/22 10:21 PM   Result Value Ref Range    Prothrombin time 10.1 9.0 - 11.1 sec    INR 1.0 0.9 - 1.1     TYPE & SCREEN    Collection Time: 04/14/22 10:23 PM   Result Value Ref Range    Crossmatch Expiration 04/17/2022,2359     ABO/Rh(D) Kayla Covarrubias Positive     Antibody screen Negative    MRSA SCREEN - PCR (NASAL)    Collection Time: 04/15/22 12:00 AM   Result Value Ref Range    MRSA by PCR, Nasal Not Detected         XR Results (maximum last 3): No results found for this or any previous visit. CT Results (maximum last 3): No results found for this or any previous visit. MRI Results (maximum last 3): No results found for this or any previous visit. Nuclear Medicine Results (maximum last 3): No results found for this or any previous visit. US Results (maximum last 3): No results found for this or any previous visit. Assessment and plan:   # Oral abscess  - Unasyn for anaerobic coverage  - Blood culture ordered. - US of neck ordered. If distinct abscess observed, will likely need drainage. - Discussed with GYN, ok for percocet. # Microcytic anemia  - Likely chronic anemia as patient is asymptomatic and has no obvious source of bleeding.   - Evaluation for iron deficiency, hemolysis and occult GI bleed. - Hold off blood transfusion for now. # Hypokalemia  - Patient denies N/V.  - Additional 40 mEq PO and  40 mEq IV potassium ordered. # Hypertensive urgency  - Labetalol PRN  - Nifedipine per Ob/Gyn    # Epilepsy  . Continue keppra.      # Pregnancy  - Consult Ob/Gyn      Signed By: Johnna Cabello MD     April 15, 2022

## 2022-04-15 NOTE — ED NOTES
Patient transferred to [facility] on 4S via [facility]. [facility] Patient states fetal movement present. Shoes, cell phone, sweater and book bag in patient's possession. No further questions.

## 2022-04-15 NOTE — ROUTINE PROCESS
Bedside shift change report given to Yann Basilio (oncoming nurse) by Corey Boothe (offgoing nurse). Report included the following information SBAR.

## 2022-04-15 NOTE — ED PROVIDER NOTES
EMERGENCY DEPARTMENT HISTORY AND PHYSICAL EXAM      Date: 4/14/2022  Patient Name: Geraldine Dubin    History of Presenting Illness       History Provided By:     HPI: Geraldine Dubin, is an extremely pleasant 39 y.o. female presenting to the ED with a chief complaint of dental pain and feeling more tired than usual.  Patient states several days ago she developed left lower dental pain. Since this time she thinks she noticed an abscess in this area. She started taking a family members clindamycin approximately 3 days ago. She has also been taking Motrin and Tylenol. Overall she thinks the abscess is getting better. Patient states she is 22 weeks pregnant and follows with her OB/GYN. History of high blood pressure not on medications. She denies any nausea, vomiting or abdominal pain. No swelling of lower extremities. Patient endorses frequent fetal movement. There are no other complaints, changes, or physical findings at this time. PCP: Bev Perez NP    No current facility-administered medications on file prior to encounter. Current Outpatient Medications on File Prior to Encounter   Medication Sig Dispense Refill    levETIRAcetam (KEPPRA) 750 mg tablet Take 1 Tab by mouth two (2) times a day. 30 Tab 0    zolpidem (AMBIEN) 5 mg tablet Take 1 Tab by mouth nightly as needed for Sleep. Max Daily Amount: 5 mg. (Patient not taking: Reported on 4/14/2022) 30 Tab 0    ALPRAZolam (XANAX) 0.25 mg tablet Take 1 Tab by mouth two (2) times daily as needed for Anxiety. Max Daily Amount: 0.5 mg. (Patient not taking: Reported on 4/14/2022) 30 Tab 0    gabapentin (NEURONTIN) 300 mg capsule Take 1 Cap by mouth two (2) times daily as needed. (Patient not taking: Reported on 4/14/2022) 60 Cap 1       Past History     Past Medical History:  Past Medical History:   Diagnosis Date    Anemia     Epilepsy ([facility] [address]     Hernia     Seizure [facility])        Past Surgical History:  History reviewed.  No pertinent surgical history. Family History:  Family History   Problem Relation Age of Onset    Other Mother         growth/tumor on brain    Migraines Mother     Hypertension Mother        Social History:  Social History     Tobacco Use    Smoking status: Current Every Day Smoker     Packs/day: 0.25    Smokeless tobacco: Never Used   Substance Use Topics    Alcohol use: Yes     Comment: rarely    Drug use: No       Allergies: Allergies   Allergen Reactions    Codeine Hives    Sulfa (Sulfonamide Antibiotics) Hives         Review of Systems     Review of Systems   Constitutional: Positive for fatigue. Negative for activity change, appetite change, chills and fever. HENT: Positive for dental problem. Negative for congestion and sore throat. Eyes: Negative for photophobia and visual disturbance. Respiratory: Negative for cough, shortness of breath and wheezing. Cardiovascular: Negative for chest pain, palpitations and leg swelling. Gastrointestinal: Negative for abdominal pain, diarrhea, nausea and vomiting. Endocrine: Negative for cold intolerance and heat intolerance. Musculoskeletal: Negative for gait problem and joint swelling. Skin: Negative for color change and rash. Neurological: Negative for dizziness and headaches. Physical Exam     Physical Exam  Constitutional:       Appearance: She is well-developed. HENT:      Head: Normocephalic and atraumatic. Right Ear: Tympanic membrane, ear canal and external ear normal.      Left Ear: Tympanic membrane, ear canal and external ear normal.      Mouth/Throat:      Mouth: Mucous membranes are moist.      Pharynx: Oropharynx is clear. Comments: 1x1 fluctuant lesion left lower gumline. Posterior oropharynx non erythematous, no tonsillar swelling or exudate. Uvula is midline. No swelling of tongue. No swelling under tongue. No tenderness to palpation of mandible nor maxilla.   No submandibular induration  Eyes: Conjunctiva/sclera: Conjunctivae normal.      Pupils: Pupils are equal, round, and reactive to light. Cardiovascular:      Rate and Rhythm: Normal rate and regular rhythm. Heart sounds: No murmur heard. Pulmonary:      Effort: No respiratory distress. Breath sounds: No stridor. No wheezing, rhonchi or rales. Abdominal:      General: There is no distension. Tenderness: There is no abdominal tenderness. There is no rebound. Comments: Abdomen is gravid, fetal heart tones 140-160. Musculoskeletal:      Cervical back: Normal range of motion and neck supple. Right lower leg: No edema. Left lower leg: No edema. Skin:     General: Skin is warm and dry. Neurological:      General: No focal deficit present. Mental Status: She is alert and oriented to person, place, and time. Psychiatric:         Mood and Affect: Mood normal.         Behavior: Behavior normal.         Lab and Diagnostic Study Results     Labs -     Recent Results (from the past 12 hour(s))   CBC WITH AUTOMATED DIFF    Collection Time: 04/14/22  9:45 PM   Result Value Ref Range    WBC 4.7 3.6 - 11.0 K/uL    RBC 3.19 (L) 3.80 - 5.20 M/uL    HGB 7.3 (L) 11.5 - 16.0 g/dL    HCT 24.5 (L) 35.0 - 47.0 %    MCV 76.8 (L) 80.0 - 99.0 FL    MCH 22.9 (L) 26.0 - 34.0 PG    MCHC 29.8 (L) 30.0 - 36.5 g/dL    RDW 17.8 (H) 11.5 - 14.5 %    PLATELET 510 462 - 176 K/uL    MPV 8.4 (L) 8.9 - 12.9 FL    NEUTROPHILS 52 32 - 75 %    LYMPHOCYTES 40 12 - 49 %    MONOCYTES 6 5 - 13 %    EOSINOPHILS 0 0 - 7 %    BASOPHILS 1 0 - 1 %    IMMATURE GRANULOCYTES 1 (H) 0.0 - 0.5 %    ABS. NEUTROPHILS 2.5 1.8 - 8.0 K/UL    ABS. LYMPHOCYTES 1.9 0.8 - 3.5 K/UL    ABS. MONOCYTES 0.3 0.0 - 1.0 K/UL    ABS. EOSINOPHILS 0.0 0.0 - 0.4 K/UL    ABS. BASOPHILS 0.0 0.0 - 0.1 K/UL    ABS. IMM.  GRANS. 0.0 0.00 - 0.04 K/UL    DF AUTOMATED     METABOLIC PANEL, COMPREHENSIVE    Collection Time: 04/14/22  9:45 PM   Result Value Ref Range    Sodium 142 136 - 145 mmol/L    Potassium 2.5 (LL) 3.5 - 5.1 mmol/L    Chloride 102 97 - 108 mmol/L    CO2 23 21 - 32 mmol/L    Anion gap 17 (H) 5 - 15 mmol/L    Glucose 88 65 - 100 mg/dL    BUN 10 6 - 20 mg/dL    Creatinine 0.65 0.55 - 1.02 mg/dL    BUN/Creatinine ratio 15 12 - 20      GFR est AA >60 >60 ml/min/1.73m2    GFR est non-AA >60 >60 ml/min/1.73m2    Calcium 8.3 (L) 8.5 - 10.1 mg/dL    Bilirubin, total 0.4 0.2 - 1.0 mg/dL    AST (SGOT) 18 15 - 37 U/L    ALT (SGPT) 14 12 - 78 U/L    Alk. phosphatase 157 (H) 45 - 117 U/L    Protein, total 7.0 6.4 - 8.2 g/dL    Albumin 2.9 (L) 3.5 - 5.0 g/dL    Globulin 4.1 (H) 2.0 - 4.0 g/dL    A-G Ratio 0.7 (L) 1.1 - 2.2     PROTEIN/CREATININE RATIO, URINE    Collection Time: 04/14/22  9:45 PM   Result Value Ref Range    Protein, urine random 77 (H) 0.0 - 11.9 mg/dL    Creatinine, urine 169.00 mg/dL    Protein/Creat.  urine Ratio 0.5     URINALYSIS W/ RFLX MICROSCOPIC    Collection Time: 04/14/22  9:45 PM   Result Value Ref Range    Color Yellow      Appearance Clear Clear      Specific gravity 1.010 1.003 - 1.030      pH (UA) 7.0 5.0 - 8.0      Protein Negative Negative mg/dL    Glucose Negative Negative mg/dL    Ketone 15 (A) Negative mg/dL    Bilirubin Negative Negative      Blood Negative Negative      Urobilinogen 0.1 (L) 0.2 - 1.0 EU/dL    Nitrites Negative Negative      Leukocyte Esterase Negative Negative      WBC 0-4 0 - 4 /hpf    RBC 0-5 0 - 5 /hpf    Epithelial cells Few Few /lpf    Bacteria 1+ (A) Negative /hpf   LD    Collection Time: 04/14/22 10:00 PM   Result Value Ref Range     81 - 246 U/L   PROTHROMBIN TIME + INR    Collection Time: 04/14/22 10:21 PM   Result Value Ref Range    Prothrombin time 10.1 9.0 - 11.1 sec    INR 1.0 0.9 - 1.1     TYPE & SCREEN    Collection Time: 04/14/22 10:23 PM   Result Value Ref Range    Crossmatch Expiration 04/17/2022,2359     ABO/Rh(D) Brenda Keepers Positive     Antibody screen Negative    MRSA SCREEN - PCR (NASAL)    Collection Time: 04/15/22 12:00 AM   Result Value Ref Range    MRSA by PCR, Nasal Not Detected         Radiologic Studies -   @[email or web address]@  CT Results  (Last 48 hours)    None        CXR Results  (Last 48 hours)    None            Medical Decision Making   - I am the first provider for this patient. - I reviewed the vital signs, available nursing notes, past medical history, past surgical history, family history and social history. - Initial assessment performed. The patients presenting problems have been discussed, and they are in agreement with the care plan formulated and outlined with them. I have encouraged them to ask questions as they arise throughout their visit. Vital Signs-Reviewed the patient's vital signs. Patient Vitals for the past 12 hrs:   Temp Pulse Resp BP SpO2   04/15/22 0216 -- (!) 104 18 (!) 163/112 98 %   04/15/22 0138 -- (!) 108 -- (!) 157/106 --   04/15/22 0051 -- (!) 101 18 (!) 161/112 100 %   04/14/22 2339 -- 99 18 (!) 153/102 100 %   04/14/22 2310 -- (!) 101 18 (!) 170/116 100 %   04/14/22 2136 -- -- -- (!) 164/110 --   04/14/22 2010 98.6 °F (37 °C) (!) 109 18 (!) 168/100 100 %         ED Course/ Provider Notes (Medical Decision Making):     Patient presented to the emergency department with a chief complaint of dental pain. On examination the patient is nontoxic and well-appearing. Vitals were reviewed per above. Abscess was incised and drained per above. After discussion regarding risks and benefits of antibiotics and pregnancy. Patient elected for penicillin for dental abscess. Of note patient's blood pressure significantly elevated. Discussed case with OB/GYN, Dr. Kimberly Segura. Preeclampsia work-up ordered. LFTs within normal range. No thrombocytopenia. LDH and urine protein creatinine ratio pending. Patient does have history of high blood pressure before pregnancy. Not on meds. OB/GYN recommends 20 mg IV labetalol and will see patient on consult.   Patient also noted to be significantly hypokalemic. This is being replenished. Patient also endorses history of chronic anemia. Hemoglobin 7.3. Microcytosis noted. No history nor exam findings consistent with acute blood loss. Lastly, noted asymptomatic bacteriuria. Urine culture ordered, antibiotics pending culture results. Case discussed with hospitalist, Dr. Saloni Whitmore who accepts admission. While patient waits for transfer at freestanding ED,, urine protein came back 77. Urine creatinine protein ratio 0.5.,  Case again discussed with OB/GYN. Feels preeclampsia unlikely with other reassuring labs. More likely chronic hypertension. Recommends giving 200 mg of labetalol orally now. Procedures   Medical Decision Makingedical Decision Making  Performed by: Lois Willis DO  I&D Abcess Simple    Date/Time: 4/15/2022 12:33 AM  Performed by: Blue Krishnamurthy DO  Authorized by: Blue Krishnamurthy DO     Consent:     Consent obtained:  Verbal    Consent given by:  Patient    Risks discussed:  Bleeding, incomplete drainage, pain and damage to other organs    Alternatives discussed:  No treatment, delayed treatment and referral  Location:     Type:  Abscess    Size:  1 x 1 cm    Location:  Mouth    Mouth location: Lateral to left lower molar. Procedure type:     Complexity:  Simple  Procedure details:     Needle aspiration: yes      Needle size:  18 G    Incision depth:  Dermal    Drainage:  Bloody    Drainage amount:  Scant    Packing materials:  None  Post-procedure details:     Patient tolerance of procedure: Tolerated well, no immediate complications      None       Disposition   Disposition:   Admission    Diagnosis     Clinical Impression:    1. Hypokalemia    2. Dental abscess    3. Microcytic anemia    4.  Pre-existing hypertension during pregnancy in second trimester, unspecified pre-existing hypertension type        Attestations:    Lois Willis DO    Please note that this dictation was completed with [employer], the computer voice recognition software. Quite often unanticipated grammatical, syntax, homophones, and other interpretive errors are inadvertently transcribed by the computer software. Please disregard these errors. Please excuse any errors that have escaped final proofreading. Thank you.

## 2022-04-15 NOTE — PROGRESS NOTES
1705- Pt to L&D from [address]. Pt c/o pain 10/10 in L ear and jaw. 1712- Pt hooked up to EFM. 1735- Fetal HR showing minimal variability, pt turned onto R side. 0- Dr. Mame Phillips at bedside to see pt. Plan of care reviewed w/ pt, orders received. Inpatient admission orders received at this time. 1856- Pt swabbed for STIs. Pt unhooked from EF and ambulated to bathroom. 1908- Pt hooked back up to EFM. Bedside transfer of care report given to PIPER Fofana RN.

## 2022-04-15 NOTE — ED TRIAGE NOTES
Patient 6 months pregnant. Presents with left sided tooth and ear pain 10/10. States had nosebleed today. Has been taking Clindamycin from family member for 4 days with Tylenol/Advil with no relief.

## 2022-04-15 NOTE — PROGRESS NOTES
Hospitalist Progress Note       Daily Progress Note: 4/15/2022 8:50 AM  Hospital course:   Carole Gusman is a 39years old female who is  currently 22-23 weeks pregnant with a PMH of epilepsy who presented to the ED with a chief complain of left lower dental pain and increased fatigue. She reported the left side of her face began experiencing tenderness approximately 2 weeks ago. Patient began taking a family members left over clindamycin on 2022, but it did not provide any relief to the symptoms. Also, she has been alternating motrin and tylenol to assist with the pain. Patient did have the oral abscess partially drained while in the ED on 2022. In addition to the dental pain, the patient reported generalized fatigue and intermittent chills. While being treated in the ED the patient was experiencing HTN. OB/GYN was consulted and they did not feel the patient was experiencing preeclampsia after reviewing her other labs. Patient was provided labetalol, which improved the patients BP. She had US of head, neck, soft tissue and preg UTS LTD complete on 04/15/2022 and pending results to see if abscess is observed. Subjective:   Patient examined at bedside. She reports she is still experiencing increased pain at dental abscess location. Patient states the pain radiates into the left side of her neck and into her left ear. Any movement or palpation to the region causes increased pain.  No other complaints at this time      Assessment/Plan:   Active Problems:  Dental Abscess  - US of head neck soft tissue complete 04/15/2022 - pending results  - Blood cultures - pending   - Continue Unasyn  - Consider I&D if abscess is observed      Hypokalemia (2022)  - Continue to monitor to trend, last drawn 2022; 2.5, pending morning labs  - Continue PO and IV potassium if necessary      Microcytic anemia (2022)  - Hgb 7.3 -> 7.0   MCV less than 80   stool culture pending  - Iron Studies - pending  - Consider IV iron while pending results. - Blood transfusion if patient Hgb drop below 7 or if she becomes symptomatic      Elevated blood pressure affecting pregnancy in second trimester, antepartum (4/14/2022) (resolved)  - Dr. Aubrey Younger with OB following patient  - Recommends Labetalol 400 mg PO every 12 hours and adjust as needed  - Maintain BP <150/90  - continue to monitor  Transfer to OB/GYN floor    Code Status: Full Code  POA/NOK: Mother: Abraham Salguero; [phone]    Disposition and discharge barriers:    Pending US results   Correct anemia  Care Plan discussed with: patient    Current Facility-Administered Medications   Medication Dose Route Frequency    levETIRAcetam (KEPPRA) tablet 750 mg  750 mg Oral BID    sodium chloride (NS) flush 5-40 mL  5-40 mL IntraVENous Q8H    sodium chloride (NS) flush 5-40 mL  5-40 mL IntraVENous PRN    acetaminophen (TYLENOL) tablet 650 mg  650 mg Oral Q6H PRN    Or    acetaminophen (TYLENOL) suppository 650 mg  650 mg Rectal Q6H PRN    polyethylene glycol (MIRALAX) packet 17 g  17 g Oral DAILY PRN    ondansetron (ZOFRAN ODT) tablet 4 mg  4 mg Oral Q8H PRN    Or    ondansetron (ZOFRAN) injection 4 mg  4 mg IntraVENous Q6H PRN    labetaloL (NORMODYNE;TRANDATE) injection 10 mg  10 mg IntraVENous Q1H PRN    potassium chloride 10 mEq in 100 ml IVPB  10 mEq IntraVENous Q1H    ampicillin-sulbactam (UNASYN) 3 g in 0.9% sodium chloride (MBP/ADV) 100 mL MBP  3 g IntraVENous Q6H    labetaloL (NORMODYNE) tablet 400 mg  400 mg Oral Q12H    oxyCODONE-acetaminophen (PERCOCET) 5-325 mg per tablet 1 Tablet  1 Tablet Oral Q6H PRN        REVIEW OF SYSTEMS    Review of Systems   Constitutional: Negative. HENT: Positive for ear pain. Negative for ear discharge. Left lower dental pain   Eyes: Negative. Respiratory: Negative for cough, shortness of breath and wheezing. Cardiovascular: Negative for chest pain and palpitations.    Gastrointestinal: Negative for abdominal pain, nausea and vomiting. Genitourinary: Negative. Musculoskeletal: Negative. Skin: Negative. Neurological: Positive for headaches. Endo/Heme/Allergies: Negative. Psychiatric/Behavioral: Negative. Objective:     Visit Vitals  BP (!) 136/94 (BP 1 Location: Left upper arm, BP Patient Position: At rest)   Pulse (!) 101   Temp 98.4 °F (36.9 °C)   Resp 18   Ht 5' 2\" (1.575 m)   Wt 83.9 kg (185 lb)   SpO2 100%   BMI 33.84 kg/m²      O2 Device: None (Room air)    Temp (24hrs), Av.4 °F (36.9 °C), Min:98.3 °F (36.8 °C), Max:98.6 °F (37 °C)      No intake/output data recorded.  1901 - 04/15 0700  In: 600 [P.O.:600]  Out: -     PHYSICAL EXAM:    Physical Exam  Vitals reviewed. HENT:      Ears:      Comments: Tenderness with movement of external ear      Mouth/Throat:      Mouth: Mucous membranes are moist.      Comments: swelling noted in left sided oral cavity  Eyes:      Extraocular Movements: Extraocular movements intact. Cardiovascular:      Rate and Rhythm: Normal rate and regular rhythm. Heart sounds: Normal heart sounds. Pulmonary:      Effort: Pulmonary effort is normal.      Breath sounds: Normal breath sounds. Abdominal:      Tenderness: There is no abdominal tenderness. Genitourinary:     Comments: ~30 weeks pregnant  Musculoskeletal:         General: Normal range of motion. Cervical back: Tenderness (Generalize swelling noted on left neck, but abscess not felt from neck to jaw) present. Skin:     General: Skin is warm. Neurological:      Mental Status: She is alert and oriented to person, place, and time.           Data Review    Recent Results (from the past 24 hour(s))   CBC WITH AUTOMATED DIFF    Collection Time: 22  9:45 PM   Result Value Ref Range    WBC 4.7 3.6 - 11.0 K/uL    RBC 3.19 (L) 3.80 - 5.20 M/uL    HGB 7.3 (L) 11.5 - 16.0 g/dL    HCT 24.5 (L) 35.0 - 47.0 %    MCV 76.8 (L) 80.0 - 99.0 FL    MCH 22.9 (L) 26.0 - 34.0 PG MCHC 29.8 (L) 30.0 - 36.5 g/dL    RDW 17.8 (H) 11.5 - 14.5 %    PLATELET 804 472 - 069 K/uL    MPV 8.4 (L) 8.9 - 12.9 FL    NEUTROPHILS 52 32 - 75 %    LYMPHOCYTES 40 12 - 49 %    MONOCYTES 6 5 - 13 %    EOSINOPHILS 0 0 - 7 %    BASOPHILS 1 0 - 1 %    IMMATURE GRANULOCYTES 1 (H) 0.0 - 0.5 %    ABS. NEUTROPHILS 2.5 1.8 - 8.0 K/UL    ABS. LYMPHOCYTES 1.9 0.8 - 3.5 K/UL    ABS. MONOCYTES 0.3 0.0 - 1.0 K/UL    ABS. EOSINOPHILS 0.0 0.0 - 0.4 K/UL    ABS. BASOPHILS 0.0 0.0 - 0.1 K/UL    ABS. IMM. GRANS. 0.0 0.00 - 0.04 K/UL    DF AUTOMATED     METABOLIC PANEL, COMPREHENSIVE    Collection Time: 04/14/22  9:45 PM   Result Value Ref Range    Sodium 142 136 - 145 mmol/L    Potassium 2.5 (LL) 3.5 - 5.1 mmol/L    Chloride 102 97 - 108 mmol/L    CO2 23 21 - 32 mmol/L    Anion gap 17 (H) 5 - 15 mmol/L    Glucose 88 65 - 100 mg/dL    BUN 10 6 - 20 mg/dL    Creatinine 0.65 0.55 - 1.02 mg/dL    BUN/Creatinine ratio 15 12 - 20      GFR est AA >60 >60 ml/min/1.73m2    GFR est non-AA >60 >60 ml/min/1.73m2    Calcium 8.3 (L) 8.5 - 10.1 mg/dL    Bilirubin, total 0.4 0.2 - 1.0 mg/dL    AST (SGOT) 18 15 - 37 U/L    ALT (SGPT) 14 12 - 78 U/L    Alk. phosphatase 157 (H) 45 - 117 U/L    Protein, total 7.0 6.4 - 8.2 g/dL    Albumin 2.9 (L) 3.5 - 5.0 g/dL    Globulin 4.1 (H) 2.0 - 4.0 g/dL    A-G Ratio 0.7 (L) 1.1 - 2.2     PROTEIN/CREATININE RATIO, URINE    Collection Time: 04/14/22  9:45 PM   Result Value Ref Range    Protein, urine random 77 (H) 0.0 - 11.9 mg/dL    Creatinine, urine 169.00 mg/dL    Protein/Creat.  urine Ratio 0.5     URINALYSIS W/ RFLX MICROSCOPIC    Collection Time: 04/14/22  9:45 PM   Result Value Ref Range    Color Yellow      Appearance Clear Clear      Specific gravity 1.010 1.003 - 1.030      pH (UA) 7.0 5.0 - 8.0      Protein Negative Negative mg/dL    Glucose Negative Negative mg/dL    Ketone 15 (A) Negative mg/dL    Bilirubin Negative Negative      Blood Negative Negative      Urobilinogen 0.1 (L) 0.2 - 1.0 EU/dL Nitrites Negative Negative      Leukocyte Esterase Negative Negative      WBC 0-4 0 - 4 /hpf    RBC 0-5 0 - 5 /hpf    Epithelial cells Few Few /lpf    Bacteria 1+ (A) Negative /hpf   LD    Collection Time: 04/14/22 10:00 PM   Result Value Ref Range     81 - 246 U/L   PROTHROMBIN TIME + INR    Collection Time: 04/14/22 10:21 PM   Result Value Ref Range    Prothrombin time 10.1 9.0 - 11.1 sec    INR 1.0 0.9 - 1.1     TYPE & SCREEN    Collection Time: 04/14/22 10:23 PM   Result Value Ref Range    Crossmatch Expiration 04/17/2022,2359     ABO/Rh(D) Jennifer Stanley Positive     Antibody screen Negative    MRSA SCREEN - PCR (NASAL)    Collection Time: 04/15/22 12:00 AM   Result Value Ref Range    MRSA by PCR, Nasal Not Detected     RETICULOCYTE COUNT    Collection Time: 04/15/22  6:27 AM   Result Value Ref Range    Reticulocyte count 1.9 0.7 - 2.1 %    Absolute Retic Cnt. 0.0564 0.0164 - 0.0776 M/ul       US PREG UTS LTD    (Results Pending)   US HEAD NECK SOFT TISSUE    (Results Pending)       _____________________________________________________________________________  Time spent in direct care including coordination of service, review of data and examination: > 35 minutes    ______________________________________________________________________________    Goldy Ann    This is dictation was done by dragon, computer voice recognition software. Quite often unanticipated grammatical, syntax, homophones and other interpretive errors or inadvertently transcribed by the computer software. Please excuse errors that have escaped final proofreading. Thank you.

## 2022-04-16 PROBLEM — O99.013 ANEMIA DURING PREGNANCY IN THIRD TRIMESTER: Status: ACTIVE | Noted: 2022-04-16

## 2022-04-16 PROBLEM — O09.33 NO PRENATAL CARE IN CURRENT PREGNANCY IN THIRD TRIMESTER: Status: ACTIVE | Noted: 2022-04-16

## 2022-04-16 PROBLEM — O12.10 PROTEINURIA DURING PREGNANCY: Status: ACTIVE | Noted: 2022-04-16

## 2022-04-16 PROBLEM — E87.6 HYPOKALEMIA: Status: RESOLVED | Noted: 2022-04-14 | Resolved: 2022-04-16

## 2022-04-16 PROBLEM — O28.8 AFI (AMNIOTIC FLUID INDEX) BORDERLINE LOW: Status: ACTIVE | Noted: 2022-04-16

## 2022-04-16 PROBLEM — R82.5 POSITIVE URINE DRUG SCREEN: Status: ACTIVE | Noted: 2022-04-16

## 2022-04-16 PROBLEM — Z34.93 PREGNANCY WITH UNCERTAIN DATES IN THIRD TRIMESTER: Status: ACTIVE | Noted: 2022-04-16

## 2022-04-16 PROBLEM — O09.523 MULTIGRAVIDA OF ADVANCED MATERNAL AGE IN THIRD TRIMESTER: Status: ACTIVE | Noted: 2022-04-16

## 2022-04-16 LAB
ALBUMIN SERPL-MCNC: 2.7 G/DL (ref 3.5–5)
ALBUMIN/GLOB SERPL: 0.6 {RATIO} (ref 1.1–2.2)
ALP SERPL-CCNC: 148 U/L (ref 45–117)
ALT SERPL-CCNC: 13 U/L (ref 12–78)
ANION GAP SERPL CALC-SCNC: 7 MMOL/L (ref 5–15)
AST SERPL W P-5'-P-CCNC: 22 U/L (ref 15–37)
BILIRUB SERPL-MCNC: 0.6 MG/DL (ref 0.2–1)
BUN SERPL-MCNC: 5 MG/DL (ref 6–20)
BUN/CREAT SERPL: 9 (ref 12–20)
CA-I BLD-MCNC: 9.1 MG/DL (ref 8.5–10.1)
CHLORIDE SERPL-SCNC: 108 MMOL/L (ref 97–108)
CO2 SERPL-SCNC: 21 MMOL/L (ref 21–32)
CREAT SERPL-MCNC: 0.54 MG/DL (ref 0.55–1.02)
CREAT UR-MCNC: 61 MG/DL
ERYTHROCYTE [DISTWIDTH] IN BLOOD BY AUTOMATED COUNT: 17.8 % (ref 11.5–14.5)
FERRITIN SERPL-MCNC: 6 NG/ML (ref 26–388)
GLOBULIN SER CALC-MCNC: 4.2 G/DL (ref 2–4)
GLUCOSE SERPL-MCNC: 137 MG/DL (ref 65–100)
HAPTOGLOB SERPL-MCNC: 87 MG/DL (ref 30–200)
HBV SURFACE AG SER QL: <0.1 INDEX
HBV SURFACE AG SER QL: NEGATIVE
HCT VFR BLD AUTO: 23.3 % (ref 35–47)
HGB BLD-MCNC: 6.9 G/DL (ref 11.5–16)
HIV 1+2 AB+HIV1 P24 AG SERPL QL IA: NONREACTIVE
HIV12 RESULT COMMENT, HHIVC: NORMAL
IRON SATN MFR SERPL: 7 % (ref 20–50)
IRON SERPL-MCNC: 41 UG/DL (ref 35–150)
MCH RBC QN AUTO: 23 PG (ref 26–34)
MCHC RBC AUTO-ENTMCNC: 29.6 G/DL (ref 30–36.5)
MCV RBC AUTO: 77.7 FL (ref 80–99)
NRBC # BLD: 0 K/UL (ref 0–0.01)
NRBC BLD-RTO: 0 PER 100 WBC
PLATELET # BLD AUTO: 197 K/UL (ref 150–400)
PMV BLD AUTO: 9.1 FL (ref 8.9–12.9)
POTASSIUM SERPL-SCNC: 4.8 MMOL/L (ref 3.5–5.1)
PROT SERPL-MCNC: 6.9 G/DL (ref 6.4–8.2)
PROT UR-MCNC: 28 MG/DL (ref 0–11.9)
PROT/CREAT UR-RTO: 0.5
RBC # BLD AUTO: 3 M/UL (ref 3.8–5.2)
RPR SER QL: NONREACTIVE
SODIUM SERPL-SCNC: 136 MMOL/L (ref 136–145)
TIBC SERPL-MCNC: 562 UG/DL (ref 250–450)
WBC # BLD AUTO: 4.5 K/UL (ref 3.6–11)

## 2022-04-16 PROCEDURE — 74011250637 HC RX REV CODE- 250/637: Performed by: OBSTETRICS & GYNECOLOGY

## 2022-04-16 PROCEDURE — 84156 ASSAY OF PROTEIN URINE: CPT

## 2022-04-16 PROCEDURE — 74011250636 HC RX REV CODE- 250/636: Performed by: INTERNAL MEDICINE

## 2022-04-16 PROCEDURE — 74011250637 HC RX REV CODE- 250/637: Performed by: INTERNAL MEDICINE

## 2022-04-16 PROCEDURE — 65410000002 HC RM PRIVATE OB

## 2022-04-16 PROCEDURE — 99232 SBSQ HOSP IP/OBS MODERATE 35: CPT | Performed by: OBSTETRICS & GYNECOLOGY

## 2022-04-16 PROCEDURE — 74011000258 HC RX REV CODE- 258: Performed by: INTERNAL MEDICINE

## 2022-04-16 PROCEDURE — 74011250636 HC RX REV CODE- 250/636: Performed by: OBSTETRICS & GYNECOLOGY

## 2022-04-16 PROCEDURE — 85027 COMPLETE CBC AUTOMATED: CPT

## 2022-04-16 PROCEDURE — 74011250637 HC RX REV CODE- 250/637: Performed by: PHYSICIAN ASSISTANT

## 2022-04-16 PROCEDURE — 80053 COMPREHEN METABOLIC PANEL: CPT

## 2022-04-16 PROCEDURE — 87070 CULTURE OTHR SPECIMN AEROBIC: CPT

## 2022-04-16 PROCEDURE — 74011000250 HC RX REV CODE- 250: Performed by: INTERNAL MEDICINE

## 2022-04-16 PROCEDURE — 36415 COLL VENOUS BLD VENIPUNCTURE: CPT

## 2022-04-16 RX ADMIN — SODIUM CHLORIDE, PRESERVATIVE FREE 10 ML: 5 INJECTION INTRAVENOUS at 23:31

## 2022-04-16 RX ADMIN — IRON SUCROSE 500 MG: 20 INJECTION, SOLUTION INTRAVENOUS at 09:02

## 2022-04-16 RX ADMIN — ZOLPIDEM TARTRATE 5 MG: 5 TABLET ORAL at 23:25

## 2022-04-16 RX ADMIN — BETAMETHASONE SODIUM PHOSPHATE AND BETAMETHASONE ACETATE 12 MG: 3; 3 INJECTION, SUSPENSION INTRA-ARTICULAR; INTRALESIONAL; INTRAMUSCULAR at 20:49

## 2022-04-16 RX ADMIN — LABETALOL HYDROCHLORIDE 400 MG: 200 TABLET, FILM COATED ORAL at 20:43

## 2022-04-16 RX ADMIN — OXYCODONE AND ACETAMINOPHEN 1 TABLET: 325; 5 TABLET ORAL at 05:52

## 2022-04-16 RX ADMIN — SODIUM CHLORIDE, PRESERVATIVE FREE 10 ML: 5 INJECTION INTRAVENOUS at 05:50

## 2022-04-16 RX ADMIN — AMPICILLIN SODIUM AND SULBACTAM SODIUM 3 G: 2; 1 INJECTION, POWDER, FOR SOLUTION INTRAMUSCULAR; INTRAVENOUS at 12:16

## 2022-04-16 RX ADMIN — Medication: at 20:42

## 2022-04-16 RX ADMIN — OXYCODONE AND ACETAMINOPHEN 1 TABLET: 325; 5 TABLET ORAL at 10:18

## 2022-04-16 RX ADMIN — OXYCODONE AND ACETAMINOPHEN 1 TABLET: 325; 5 TABLET ORAL at 14:53

## 2022-04-16 RX ADMIN — LEVETIRACETAM 750 MG: 250 TABLET, FILM COATED ORAL at 20:45

## 2022-04-16 RX ADMIN — LABETALOL HYDROCHLORIDE 400 MG: 200 TABLET, FILM COATED ORAL at 08:01

## 2022-04-16 RX ADMIN — LEVETIRACETAM 750 MG: 250 TABLET, FILM COATED ORAL at 08:07

## 2022-04-16 RX ADMIN — AMPICILLIN SODIUM AND SULBACTAM SODIUM 3 G: 2; 1 INJECTION, POWDER, FOR SOLUTION INTRAMUSCULAR; INTRAVENOUS at 19:03

## 2022-04-16 RX ADMIN — AMPICILLIN SODIUM AND SULBACTAM SODIUM 3 G: 2; 1 INJECTION, POWDER, FOR SOLUTION INTRAMUSCULAR; INTRAVENOUS at 05:48

## 2022-04-16 RX ADMIN — ACETAMINOPHEN 650 MG: 325 TABLET ORAL at 23:28

## 2022-04-16 RX ADMIN — POTASSIUM CHLORIDE 40 MEQ: 750 TABLET, FILM COATED, EXTENDED RELEASE ORAL at 08:01

## 2022-04-16 RX ADMIN — OXYCODONE AND ACETAMINOPHEN 1 TABLET: 325; 5 TABLET ORAL at 18:22

## 2022-04-16 NOTE — PROGRESS NOTES
OB PROGRESS NOTE    PROBLEM:  -Intrauterine pregnancy at 33-3/7-week gestation by late, third trimester ultrasound  -Proteinuria during pregnancy  -Amniotic fluid index borderline low  -Pregnancy with uncertain dates in third trimester  -Advanced maternal age  -Positive urine drug screen for barbiturates, not on patient's medication list  -No prenatal care in current pregnancy in third trimester  -Dental abscess  -History of seizure disorder  -Gestational hypertension suspected, chronic hypertension cannot be ruled out    SUBJECTIVE: Patient states pain is improving. She has no other complaint. VITALS:  Visit Vitals  BP (!) 151/100   Pulse (!) 103   Temp 97.9 °F (36.6 °C)   Resp 18   Ht 5' 2\" (1.575 m)   Wt 83.9 kg (185 lb)   SpO2 100%   Breastfeeding No   BMI 33.84 kg/m²     HEART: Regular rhythm, no murmur  LUNGS: Clear to auscultation at both fields  ABDOMEN: Gravid, fetal heart tones present  PELVIC: Cervical dilation: 1-2 cm, effacement: 60%, Station: -3, presentation: Vertex, membranes: Intact      LABS:  Recent Results (from the past 24 hour(s))   DRUG SCREEN, URINE    Collection Time: 04/15/22  3:32 PM   Result Value Ref Range    AMPHETAMINES Negative Negative      BARBITURATES Positive (A) Negative      BENZODIAZEPINES Negative Negative      COCAINE Negative Negative      METHADONE Negative Negative      OPIATES Negative Negative      PCP(PHENCYCLIDINE) Negative Negative      THC (TH-CANNABINOL) Negative Negative      Drug screen comment        This test is a screen for drugs of abuse in a medical setting only (i.e., they are unconfirmed results and as such must not be used for non-medical purposes, e.g.,employment testing, legal testing). Due to its inherent nature, false positive (FP) and false negative (FN) results may be obtained. Therefore, if necessary for medical care, recommend confirmation of positive findings by GC/MS.    TRICHOMONAS RAPID AG    Collection Time: 04/15/22  6:30 PM   Result Value Ref Range    Trichomonas, rapid Ag Negative Negative     HIV-1,2 P24 AG/AB SCREEN    Collection Time: 04/15/22  6:41 PM   Result Value Ref Range    p24 Antigen Nonreactive Nonreactive      HIV-1,2 Ab Nonreactive Nonreactive     URIC ACID    Collection Time: 04/15/22  6:41 PM   Result Value Ref Range    Uric acid 6.4 (H) 2.6 - 6.0 mg/dL   MAGNESIUM    Collection Time: 04/15/22  6:41 PM   Result Value Ref Range    Magnesium 1.8 1.6 - 2.4 mg/dL   CBC W/O DIFF    Collection Time: 04/16/22  6:00 AM   Result Value Ref Range    WBC 4.5 3.6 - 11.0 K/uL    RBC 3.00 (L) 3.80 - 5.20 M/uL    HGB 6.9 (L) 11.5 - 16.0 g/dL    HCT 23.3 (L) 35.0 - 47.0 %    MCV 77.7 (L) 80.0 - 99.0 FL    MCH 23.0 (L) 26.0 - 34.0 PG    MCHC 29.6 (L) 30.0 - 36.5 g/dL    RDW 17.8 (H) 11.5 - 14.5 %    PLATELET 418 134 - 593 K/uL    MPV 9.1 8.9 - 12.9 FL    NRBC 0.0 0.0  WBC    ABSOLUTE NRBC 0.00 0.00 - 4.78 K/uL   METABOLIC PANEL, COMPREHENSIVE    Collection Time: 04/16/22  6:00 AM   Result Value Ref Range    Sodium 136 136 - 145 mmol/L    Potassium 4.8 3.5 - 5.1 mmol/L    Chloride 108 97 - 108 mmol/L    CO2 21 21 - 32 mmol/L    Anion gap 7 5 - 15 mmol/L    Glucose 137 (H) 65 - 100 mg/dL    BUN 5 (L) 6 - 20 mg/dL    Creatinine 0.54 (L) 0.55 - 1.02 mg/dL    BUN/Creatinine ratio 9 (L) 12 - 20      GFR est AA >60 >60 ml/min/1.73m2    GFR est non-AA >60 >60 ml/min/1.73m2    Calcium 9.1 8.5 - 10.1 mg/dL    Bilirubin, total 0.6 0.2 - 1.0 mg/dL    AST (SGOT) 22 15 - 37 U/L    ALT (SGPT) 13 12 - 78 U/L    Alk. phosphatase 148 (H) 45 - 117 U/L    Protein, total 6.9 6.4 - 8.2 g/dL    Albumin 2.7 (L) 3.5 - 5.0 g/dL    Globulin 4.2 (H) 2.0 - 4.0 g/dL    A-G Ratio 0.6 (L) 1.1 - 2.2         OB ultrasound:   Exam date: April 15, 2022. Study Result    Narrative & Impression   Dating.     No comparison.     Findings: Transabdominal ultrasound imaging of the pelvic structures. Single  live intrauterine gestation.     Presentation: Cephalic.   Placenta: Anterior not low-lying. Amniotic fluid: 6.14 cm. Cervix: Not seen.     BPD: 8.60 cm 34 weeks 5 days. 83%ile. HC: 30.49 cm 34 weeks 0 days. 29%ile. Abdominal circumference: 28.24 cm 32 weeks 2 days. 23%ile. Femur length: 6.11 cm 31 weeks 6 days. 8%ile. Estimated fetal weight 4 lbs. 6 oz. +/- 10 ounces. 20 %ile. Fetal heart rate 134 beats per minute.        IMPRESSION  1. Single live intrauterine pregnancy 33 weeks 2 days, estimated date delivery  2022.  2. Amniotic fluid index of 6.14 cm, is just above the 5th percentile for  estimated gestational age of 29 weeks 2 days. NST: Reactive      ASSESSMENT: 39years old -0-1-3 with pregnancy at 33-3/7-week gestation by late ultrasound, no prenatal care, history of seizure disorder, admitted after findings of dental abscess and pain. Patient has been self-medicating with her mother's clindamycin for dental abscess and Fioricet for headache. Patient also is showing elevated blood pressure readings, some of them critically high. There is no history of chronic hypertension but we cannot rule out that possibility due to lack of prenatal care. Preeclampsia work-up is significant for proteinuria (urine protein creatinine ratio: 0.45) and slight uric acid elevation (6.4), which is suspicious for chronic hypertension with superimposed preeclampsia in an early stage. OB ultrasound shows borderline low amniotic fluid index, which is a common finding on patient with chronic hypertension. In view of no definite evidence of chronic hypertension, preeclampsia or chronic hypertension with superimposed preeclampsia, p.o. labetalol may be in order. Patient started on betamethasone for fetal lung maturation anticipating  delivery. Due to significant anemia, iron infusion is recommended at this time.     PLAN:  GBS culture  Repeat urine protein-creatinine ratio  Iron infusion  Restart labetalol 400 mg p.o. twice daily  Continue IV antibiotics  Complete betamethasone protocol for fetal lung maturation  Pain management  Intermittent fetal monitoring

## 2022-04-16 NOTE — PROGRESS NOTES
1900: Assumed care of pt with [facility] at this time. Bedside report received from Ольга Baeza RN. Pt is c/o pain in her gum that is unrelieved by percocet. Warm washcloth offered to pt.     1915: Dr. Vik Ramos notified of pt complaint of gum pain unrelieved by percocet. Suggested oragel. Order received. 8218: Bedside report given to Ольга Baeza RN. Pt complaining of indigestion at this time. Will notify MD>     1889: Dr. Vik Ramos on unit. Notified of pt complaint of indigestion. Order received.

## 2022-04-16 NOTE — PROGRESS NOTES
0710- Bedside shift change report received from PIPER Fofana RN.    1981- Previous BP elevated, BP cuff adjusted and BP rechecked. 18- On call OB/GYN called, notified of BP and hgb level. Orders received. 0800- Vital signs assessed and shift assessment performed, see flowsheet for details. Pt unhooked from EFM. PO labetalol and potassium administered. 6262- Scheduled PO keppra administered. Pt provided breakfast tray. 1016- Dr. Christin Curtis at bedside. MD performed SVE, 1/60/-3. Pt rating dental pain 8/10, see MAR for interventions. 1140- GBS swab collected and tubed to lab. 1216- Scheduled IV unasyn administered. Dietary delivering lunch tray. Pt laying in bed comfortably, talking on phone. BP assessed, see flowsheet. 1255- Pt placed on EFM for intermittent monitoring. 1445- Pt sitting in bed eating lunch. Vital signs assessed. Pt c/o dental pain 8/10, see MAR. Pt ambulatory to bathroom. Urine sample collected and tubed to lab for urine protein/creatinine ratio. 1712- Requested scheduled 1800 dose of antibiotics from pharmacy. 1730- Dietary delivered dinner tray to pt. 1815- BP assessed. Pt c/o pain 8/10, see MAR.    0541- Still have no received antibiotics from pharmacy. Second request sent. 1900- Bedside shift change report given to oncoming RN.

## 2022-04-16 NOTE — PROGRESS NOTES
Hospitalist Progress Note       Daily Progress Note: 2022 8:50 AM  Hospital course:   Pedrito Sun is a 39years old female who is  currently 22-23 weeks pregnant with a PMH of epilepsy who presented to the ED with a chief complain of left lower dental pain and increased fatigue. She reported the left side of her face began experiencing tenderness approximately 2 weeks ago. Patient began taking a family members left over clindamycin on 2022, but it did not provide any relief to the symptoms. Also, she has been alternating motrin and tylenol to assist with the pain. Patient did have the oral abscess partially drained while in the ED on 2022. In addition to the dental pain, the patient reported generalized fatigue and intermittent chills. While being treated in the ED the patient was experiencing HTN. OB/GYN was consulted and they did not feel the patient was experiencing preeclampsia after reviewing her other labs. Patient was provided labetalol, which improved the patients BP. She had US of head, neck, soft tissue and preg UTS LTD complete on 04/15/2022 and pending results to see if abscess is observed. Subjective:   Patient examined at bedside. Improved pain and swelling resolved      Assessment/Plan:   Active Problems:  Dental Abscess  - US of head neck soft tissue complete 04/15/2022 -negative for abscess   - Blood cultures - pending   - Continue Unasyn  - Consider I&D if abscess is observed      Hypokalemia (2022)  Resolved      Microcytic anemia (2022)  - Hgb 7.3 -> 7.0-->6.9   MCV less than 80   stool culture pending  - Iron Studies -low iron percentage and ferritin with increased TIBC.   Iron supplementation.  - Blood transfusion if patient Hgb drop below 7 or if she becomes symptomatic  Repeat CBC      Elevated blood pressure affecting pregnancy in second trimester, antepartum (2022) (resolved)  - Dr. Parag Kinney with OB following patient  - Recommends Labetalol 400 mg PO every 12 hours and adjust as needed  - Maintain BP <150/90  - continue to monitor  Transfer to OB/GYN floor    Code Status: Full Code  POA/NOK: Mother: Yolanda Hickey; [phone]    Disposition and discharge barriers:    Correct anemia  Care Plan discussed with: patient    Current Facility-Administered Medications   Medication Dose Route Frequency    levETIRAcetam (KEPPRA) tablet 750 mg  750 mg Oral BID    sodium chloride (NS) flush 5-40 mL  5-40 mL IntraVENous Q8H    sodium chloride (NS) flush 5-40 mL  5-40 mL IntraVENous PRN    acetaminophen (TYLENOL) tablet 650 mg  650 mg Oral Q6H PRN    Or    acetaminophen (TYLENOL) suppository 650 mg  650 mg Rectal Q6H PRN    polyethylene glycol (MIRALAX) packet 17 g  17 g Oral DAILY PRN    ondansetron (ZOFRAN ODT) tablet 4 mg  4 mg Oral Q8H PRN    Or    ondansetron (ZOFRAN) injection 4 mg  4 mg IntraVENous Q6H PRN    labetaloL (NORMODYNE;TRANDATE) injection 10 mg  10 mg IntraVENous Q1H PRN    ampicillin-sulbactam (UNASYN) 3 g in 0.9% sodium chloride (MBP/ADV) 100 mL MBP  3 g IntraVENous Q6H    labetaloL (NORMODYNE) tablet 400 mg  400 mg Oral Q12H    oxyCODONE-acetaminophen (PERCOCET) 5-325 mg per tablet 1 Tablet  1 Tablet Oral Q4H PRN    betamethasone (CELESTONE) injection 12 mg  12 mg IntraMUSCular Q24H    zolpidem (AMBIEN) tablet 5 mg  5 mg Oral QHS PRN        REVIEW OF SYSTEMS    Review of Systems   Constitutional: Negative. HENT: Positive for ear pain. Negative for ear discharge. Left lower dental pain   Eyes: Negative. Respiratory: Negative for cough, shortness of breath and wheezing. Cardiovascular: Negative for chest pain and palpitations. Gastrointestinal: Negative for abdominal pain, nausea and vomiting. Genitourinary: Negative. Musculoskeletal: Negative. Skin: Negative. Neurological: Positive for headaches. Endo/Heme/Allergies: Negative. Psychiatric/Behavioral: Negative.          Objective:     Visit Vitals  BP 136/85   Pulse (!) 116   Temp 97.9 °F (36.6 °C)   Resp 18   Ht 5' 2\" (1.575 m)   Wt 83.9 kg (185 lb)   SpO2 100%   Breastfeeding No   BMI 33.84 kg/m²      O2 Device: None (Room air)    Temp (24hrs), Av.7 °F (36.5 °C), Min:97.4 °F (36.3 °C), Max:98 °F (36.7 °C)      No intake/output data recorded.  1901 -  0700  In: 2300 [P.O.:1400; I.V.:900]  Out: 600 [Urine:600]    PHYSICAL EXAM:    Physical Exam  Vitals reviewed. HENT:      Ears:      Comments: Tenderness with movement of external ear      Mouth/Throat:      Mouth: Mucous membranes are moist.      Comments: swelling noted in left sided oral cavity  Eyes:      Extraocular Movements: Extraocular movements intact. Cardiovascular:      Rate and Rhythm: Normal rate and regular rhythm. Heart sounds: Normal heart sounds. Pulmonary:      Effort: Pulmonary effort is normal.      Breath sounds: Normal breath sounds. Abdominal:      Tenderness: There is no abdominal tenderness. Genitourinary:     Comments: ~30 weeks pregnant  Musculoskeletal:         General: Normal range of motion. Cervical back: Tenderness (Generalize swelling noted on left neck, but abscess not felt from neck to jaw) present. Skin:     General: Skin is warm. Neurological:      Mental Status: She is alert and oriented to person, place, and time.           Data Review    Recent Results (from the past 24 hour(s))   DRUG SCREEN, URINE    Collection Time: 04/15/22  3:32 PM   Result Value Ref Range    AMPHETAMINES Negative Negative      BARBITURATES Positive (A) Negative      BENZODIAZEPINES Negative Negative      COCAINE Negative Negative      METHADONE Negative Negative      OPIATES Negative Negative      PCP(PHENCYCLIDINE) Negative Negative      THC (TH-CANNABINOL) Negative Negative      Drug screen comment        This test is a screen for drugs of abuse in a medical setting only (i.e., they are unconfirmed results and as such must not be used for non-medical purposes, e.g.,employment testing, legal testing). Due to its inherent nature, false positive (FP) and false negative (FN) results may be obtained. Therefore, if necessary for medical care, recommend confirmation of positive findings by GC/MS. TRICHOMONAS RAPID AG    Collection Time: 04/15/22  6:30 PM   Result Value Ref Range    Trichomonas, rapid Ag Negative Negative     HIV-1,2 P24 AG/AB SCREEN    Collection Time: 04/15/22  6:41 PM   Result Value Ref Range    p24 Antigen Nonreactive Nonreactive      HIV-1,2 Ab Nonreactive Nonreactive     HIV 1/2 AG/AB, 4TH GENERATION,W RFLX CONFIRM    Collection Time: 04/15/22  6:41 PM   Result Value Ref Range    HIV 1/2 Interpretation NONREACTIVE NONREACTIVE    HIV 1/2 result comment SEE NOTE     HEP B SURFACE AG    Collection Time: 04/15/22  6:41 PM   Result Value Ref Range    Hepatitis B surface Ag <0.10 Index    Hep B surface Ag Interp.  Negative Negative   URIC ACID    Collection Time: 04/15/22  6:41 PM   Result Value Ref Range    Uric acid 6.4 (H) 2.6 - 6.0 mg/dL   MAGNESIUM    Collection Time: 04/15/22  6:41 PM   Result Value Ref Range    Magnesium 1.8 1.6 - 2.4 mg/dL   CBC W/O DIFF    Collection Time: 04/16/22  6:00 AM   Result Value Ref Range    WBC 4.5 3.6 - 11.0 K/uL    RBC 3.00 (L) 3.80 - 5.20 M/uL    HGB 6.9 (L) 11.5 - 16.0 g/dL    HCT 23.3 (L) 35.0 - 47.0 %    MCV 77.7 (L) 80.0 - 99.0 FL    MCH 23.0 (L) 26.0 - 34.0 PG    MCHC 29.6 (L) 30.0 - 36.5 g/dL    RDW 17.8 (H) 11.5 - 14.5 %    PLATELET 534 316 - 010 K/uL    MPV 9.1 8.9 - 12.9 FL    NRBC 0.0 0.0  WBC    ABSOLUTE NRBC 0.00 0.00 - 6.18 K/uL   METABOLIC PANEL, COMPREHENSIVE    Collection Time: 04/16/22  6:00 AM   Result Value Ref Range    Sodium 136 136 - 145 mmol/L    Potassium 4.8 3.5 - 5.1 mmol/L    Chloride 108 97 - 108 mmol/L    CO2 21 21 - 32 mmol/L    Anion gap 7 5 - 15 mmol/L    Glucose 137 (H) 65 - 100 mg/dL    BUN 5 (L) 6 - 20 mg/dL    Creatinine 0.54 (L) 0.55 - 1.02 mg/dL    BUN/Creatinine ratio 9 (L) 12 - 20      GFR est AA >60 >60 ml/min/1.73m2    GFR est non-AA >60 >60 ml/min/1.73m2    Calcium 9.1 8.5 - 10.1 mg/dL    Bilirubin, total 0.6 0.2 - 1.0 mg/dL    AST (SGOT) 22 15 - 37 U/L    ALT (SGPT) 13 12 - 78 U/L    Alk. phosphatase 148 (H) 45 - 117 U/L    Protein, total 6.9 6.4 - 8.2 g/dL    Albumin 2.7 (L) 3.5 - 5.0 g/dL    Globulin 4.2 (H) 2.0 - 4.0 g/dL    A-G Ratio 0.6 (L) 1.1 - 2.2         US HEAD NECK SOFT TISSUE   Final Result   1. No abnormal fluid collection is evident by sonogram. For persistent clinical   concern, recommend dedicated CT, preferably with IV contrast.      [facility]   Final Result   1. Single live intrauterine pregnancy 33 weeks 2 days, estimated date delivery   6/1/2022.   2. Amniotic fluid index of 6.14 cm, is just above the 5th percentile for   estimated gestational age of 29 weeks 2 days. _____________________________________________________________________________  Time spent in direct care including coordination of service, review of data and examination: > 35 minutes    ______________________________________________________________________________    Lakeisha Matute PA-C    This is dictation was done by dragon, computer voice recognition software. Quite often unanticipated grammatical, syntax, homophones and other interpretive errors or inadvertently transcribed by the computer software. Please excuse errors that have escaped final proofreading. Thank you.

## 2022-04-17 LAB
ALBUMIN SERPL-MCNC: 2.8 G/DL (ref 3.5–5)
ALBUMIN/GLOB SERPL: 0.7 {RATIO} (ref 1.1–2.2)
ALP SERPL-CCNC: 149 U/L (ref 45–117)
ALT SERPL-CCNC: 16 U/L (ref 12–78)
ANION GAP SERPL CALC-SCNC: 9 MMOL/L (ref 5–15)
AST SERPL W P-5'-P-CCNC: 27 U/L (ref 15–37)
BACTERIA SPEC CULT: ABNORMAL
BASOPHILS # BLD: 0 K/UL (ref 0–0.1)
BASOPHILS NFR BLD: 0 % (ref 0–1)
BILIRUB SERPL-MCNC: 0.3 MG/DL (ref 0.2–1)
BUN SERPL-MCNC: 4 MG/DL (ref 6–20)
BUN/CREAT SERPL: 7 (ref 12–20)
C TRACH RRNA SPEC QL NAA+PROBE: NEGATIVE
CA-I BLD-MCNC: 8.5 MG/DL (ref 8.5–10.1)
CHLORIDE SERPL-SCNC: 104 MMOL/L (ref 97–108)
CO2 SERPL-SCNC: 25 MMOL/L (ref 21–32)
COLONY COUNT,CNT: ABNORMAL
CREAT SERPL-MCNC: 0.57 MG/DL (ref 0.55–1.02)
CREAT UR-MCNC: 126 MG/DL
DIFFERENTIAL METHOD BLD: ABNORMAL
EOSINOPHIL # BLD: 0 K/UL (ref 0–0.4)
EOSINOPHIL NFR BLD: 0 % (ref 0–7)
ERYTHROCYTE [DISTWIDTH] IN BLOOD BY AUTOMATED COUNT: 18.3 % (ref 11.5–14.5)
ERYTHROCYTE [DISTWIDTH] IN BLOOD BY AUTOMATED COUNT: 18.3 % (ref 11.5–14.5)
GLOBULIN SER CALC-MCNC: 3.9 G/DL (ref 2–4)
GLUCOSE SERPL-MCNC: 123 MG/DL (ref 65–100)
HCT VFR BLD AUTO: 22.6 % (ref 35–47)
HCT VFR BLD AUTO: 22.7 % (ref 35–47)
HGB BLD-MCNC: 6.6 G/DL (ref 11.5–16)
HGB BLD-MCNC: 6.7 G/DL (ref 11.5–16)
IMM GRANULOCYTES # BLD AUTO: 0 K/UL
IMM GRANULOCYTES NFR BLD AUTO: 0 %
LYMPHOCYTES # BLD: 1.2 K/UL (ref 0.8–3.5)
LYMPHOCYTES NFR BLD: 15 % (ref 12–49)
MCH RBC QN AUTO: 22.8 PG (ref 26–34)
MCH RBC QN AUTO: 23.5 PG (ref 26–34)
MCHC RBC AUTO-ENTMCNC: 29.1 G/DL (ref 30–36.5)
MCHC RBC AUTO-ENTMCNC: 29.6 G/DL (ref 30–36.5)
MCV RBC AUTO: 78.3 FL (ref 80–99)
MCV RBC AUTO: 79.3 FL (ref 80–99)
MONOCYTES # BLD: 0.5 K/UL (ref 0–1)
MONOCYTES NFR BLD: 6 % (ref 5–13)
N GONORRHOEA RRNA SPEC QL NAA+PROBE: NEGATIVE
NEUTS SEG # BLD: 6.1 K/UL (ref 1.8–8)
NEUTS SEG NFR BLD: 79 % (ref 32–75)
NRBC # BLD: 0.18 K/UL (ref 0–0.01)
NRBC # BLD: 0.2 K/UL (ref 0–0.01)
NRBC BLD-RTO: 2.3 PER 100 WBC
NRBC BLD-RTO: 2.5 PER 100 WBC
PLATELET # BLD AUTO: 197 K/UL (ref 150–400)
PLATELET # BLD AUTO: 197 K/UL (ref 150–400)
PLEASE NOTE:, 188601: NORMAL
PMV BLD AUTO: 9.3 FL (ref 8.9–12.9)
PMV BLD AUTO: 9.7 FL (ref 8.9–12.9)
POTASSIUM SERPL-SCNC: 3.4 MMOL/L (ref 3.5–5.1)
PROT SERPL-MCNC: 6.7 G/DL (ref 6.4–8.2)
PROT UR-MCNC: 47 MG/DL (ref 0–11.9)
PROT/CREAT UR-RTO: 0.4
RBC # BLD AUTO: 2.85 M/UL (ref 3.8–5.2)
RBC # BLD AUTO: 2.9 M/UL (ref 3.8–5.2)
RBC MORPH BLD: ABNORMAL
RUBV IGG SERPL IA-ACNC: <0.9 INDEX
RUBV IGM SER IA-ACNC: <20 AU/ML (ref 0–19.9)
SODIUM SERPL-SCNC: 138 MMOL/L (ref 136–145)
SPECIAL REQUESTS,SREQ: ABNORMAL
SPECIMEN SOURCE: NORMAL
URATE SERPL-MCNC: 5.3 MG/DL (ref 2.6–6)
WBC # BLD AUTO: 7.8 K/UL (ref 3.6–11)
WBC # BLD AUTO: 8.1 K/UL (ref 3.6–11)

## 2022-04-17 PROCEDURE — 84156 ASSAY OF PROTEIN URINE: CPT

## 2022-04-17 PROCEDURE — 36415 COLL VENOUS BLD VENIPUNCTURE: CPT

## 2022-04-17 PROCEDURE — 74011000250 HC RX REV CODE- 250: Performed by: INTERNAL MEDICINE

## 2022-04-17 PROCEDURE — 74011000258 HC RX REV CODE- 258: Performed by: INTERNAL MEDICINE

## 2022-04-17 PROCEDURE — 85027 COMPLETE CBC AUTOMATED: CPT

## 2022-04-17 PROCEDURE — 74011000250 HC RX REV CODE- 250: Performed by: OBSTETRICS & GYNECOLOGY

## 2022-04-17 PROCEDURE — 74011250637 HC RX REV CODE- 250/637: Performed by: OBSTETRICS & GYNECOLOGY

## 2022-04-17 PROCEDURE — 84550 ASSAY OF BLOOD/URIC ACID: CPT

## 2022-04-17 PROCEDURE — 65410000002 HC RM PRIVATE OB

## 2022-04-17 PROCEDURE — 74011250637 HC RX REV CODE- 250/637: Performed by: INTERNAL MEDICINE

## 2022-04-17 PROCEDURE — 74011000258 HC RX REV CODE- 258: Performed by: OBSTETRICS & GYNECOLOGY

## 2022-04-17 PROCEDURE — 74011250636 HC RX REV CODE- 250/636: Performed by: OBSTETRICS & GYNECOLOGY

## 2022-04-17 PROCEDURE — 74011250636 HC RX REV CODE- 250/636: Performed by: INTERNAL MEDICINE

## 2022-04-17 PROCEDURE — 85025 COMPLETE CBC W/AUTO DIFF WBC: CPT

## 2022-04-17 PROCEDURE — 80053 COMPREHEN METABOLIC PANEL: CPT

## 2022-04-17 PROCEDURE — 99231 SBSQ HOSP IP/OBS SF/LOW 25: CPT | Performed by: OBSTETRICS & GYNECOLOGY

## 2022-04-17 RX ORDER — CALCIUM CARBONATE 200(500)MG
400 TABLET,CHEWABLE ORAL
Status: DISCONTINUED | OUTPATIENT
Start: 2022-04-17 | End: 2022-04-20

## 2022-04-17 RX ORDER — AMOXICILLIN AND CLAVULANATE POTASSIUM 875; 125 MG/1; MG/1
1 TABLET, FILM COATED ORAL 2 TIMES DAILY WITH MEALS
Status: CANCELLED | OUTPATIENT
Start: 2022-04-17 | End: 2022-04-27

## 2022-04-17 RX ADMIN — ZOLPIDEM TARTRATE 5 MG: 5 TABLET ORAL at 21:05

## 2022-04-17 RX ADMIN — AMPICILLIN SODIUM AND SULBACTAM SODIUM 3 G: 2; 1 INJECTION, POWDER, FOR SOLUTION INTRAMUSCULAR; INTRAVENOUS at 07:05

## 2022-04-17 RX ADMIN — SODIUM CHLORIDE, PRESERVATIVE FREE 20 MG: 5 INJECTION INTRAVENOUS at 20:06

## 2022-04-17 RX ADMIN — LEVETIRACETAM 750 MG: 250 TABLET, FILM COATED ORAL at 21:05

## 2022-04-17 RX ADMIN — OXYCODONE AND ACETAMINOPHEN 1 TABLET: 325; 5 TABLET ORAL at 02:44

## 2022-04-17 RX ADMIN — LABETALOL HYDROCHLORIDE 400 MG: 200 TABLET, FILM COATED ORAL at 21:04

## 2022-04-17 RX ADMIN — OXYCODONE AND ACETAMINOPHEN 1 TABLET: 325; 5 TABLET ORAL at 19:27

## 2022-04-17 RX ADMIN — AMPICILLIN SODIUM AND SULBACTAM SODIUM 3 G: 2; 1 INJECTION, POWDER, FOR SOLUTION INTRAMUSCULAR; INTRAVENOUS at 01:26

## 2022-04-17 RX ADMIN — LEVETIRACETAM 750 MG: 250 TABLET, FILM COATED ORAL at 08:35

## 2022-04-17 RX ADMIN — ACETAMINOPHEN 650 MG: 325 TABLET ORAL at 21:05

## 2022-04-17 RX ADMIN — CALCIUM CARBONATE (ANTACID) CHEW TAB 500 MG 400 MG: 500 CHEW TAB at 17:26

## 2022-04-17 RX ADMIN — SODIUM CHLORIDE, PRESERVATIVE FREE 10 ML: 5 INJECTION INTRAVENOUS at 07:04

## 2022-04-17 RX ADMIN — OXYCODONE AND ACETAMINOPHEN 1 TABLET: 325; 5 TABLET ORAL at 08:58

## 2022-04-17 RX ADMIN — AMPICILLIN SODIUM AND SULBACTAM SODIUM 3 G: 2; 1 INJECTION, POWDER, FOR SOLUTION INTRAMUSCULAR; INTRAVENOUS at 12:59

## 2022-04-17 RX ADMIN — LABETALOL HYDROCHLORIDE 400 MG: 200 TABLET, FILM COATED ORAL at 08:35

## 2022-04-17 RX ADMIN — CALCIUM CARBONATE (ANTACID) CHEW TAB 500 MG 400 MG: 500 CHEW TAB at 08:59

## 2022-04-17 RX ADMIN — AMPICILLIN SODIUM AND SULBACTAM SODIUM 3 G: 2; 1 INJECTION, POWDER, FOR SOLUTION INTRAMUSCULAR; INTRAVENOUS at 18:35

## 2022-04-17 RX ADMIN — ONDANSETRON 4 MG: 4 TABLET, ORALLY DISINTEGRATING ORAL at 21:49

## 2022-04-17 RX ADMIN — ACETAMINOPHEN 650 MG: 325 TABLET ORAL at 15:28

## 2022-04-17 NOTE — PROGRESS NOTES
1905: Bedside shift report received from Kofi Frey RN.    5942: Patient placed on EFM monitor at this time. 1956: Dr. Asha King present at bedside. POC discussed with patient. Questions answered. EFM tracing reviewed at this time. 5: Dr. Asha King notified of patient developing a cough and a headache. New orders received at this time. 0710: Bedside shift report given to Yesika Callejas RN.

## 2022-04-17 NOTE — PROGRESS NOTES
OB PROGRESS NOTE    PROBLEM:  -Intrauterine pregnancy at 33-4/7-week gestation by late, third trimester ultrasound  -Proteinuria during pregnancy  -Amniotic fluid index borderline low  -Pregnancy with uncertain dates in third trimester  -Advanced maternal age  -Positive urine drug screen for barbiturates, not on patient's medication list  -No prenatal care in current pregnancy in third trimester  -Dental abscess  -History of seizure disorder  -Gestational hypertension suspected, chronic hypertension cannot be ruled out    SUBJECTIVE: Patient is doing better, significant pain improvement. VITALS:  Visit Vitals  /85   Pulse 99   Temp 98.1 °F (36.7 °C)   Resp 18   Ht 5' 2\" (1.575 m)   Wt 83.9 kg (185 lb)   SpO2 100%   Breastfeeding No   BMI 33.84 kg/m²     HEART: Regular rhythm, no murmur  LUNGS: Clear to auscultation at both fields  ABDOMEN: Gravid, fetal heart tones present    NST: Reactive    ASSESSMENT: Improvement from dental pain after IV antibiotics and analgesics. Adequate blood pressure control after labetalol treatment. Follow-up preeclampsia work-up ordered. PLAN:  CBC, CMP, uric acid, urine protein creatinine ratio  Continue IV antibiotics  MFM consultation after laboratory work-up may be in order.

## 2022-04-17 NOTE — PROGRESS NOTES
Hospitalist Progress Note       Daily Progress Note: 2022 8:50 AM  Hospital course:   Eileen Cortez is a 39years old female who is  currently 22-23 weeks pregnant with a PMH of epilepsy who presented to the ED with a chief complain of left lower dental pain and increased fatigue. She reported the left side of her face began experiencing tenderness approximately 2 weeks ago. Patient began taking a family members left over clindamycin on 2022, but it did not provide any relief to the symptoms. Also, she has been alternating motrin and tylenol to assist with the pain. Patient did have the oral abscess partially drained while in the ED on 2022. In addition to the dental pain, the patient reported generalized fatigue and intermittent chills. While being treated in the ED the patient was experiencing HTN. OB/GYN was consulted and they did not feel the patient was experiencing preeclampsia after reviewing her other labs. Patient was provided labetalol, which improved the patients BP. She had US of head, neck, soft tissue and preg UTS LTD complete on 04/15/2022. US of head and neck revealed no abnormal fluid collect. Ultrasound revealed single live intrauterine pregnancy 33 weeks 2 days, estimated date delivery 2022. Patients dental pain and hypokalemia have been resolved. OB/GYN has accepted care for the patient. Will sign off for now. Recommending antibiotics for 10 days Augmentin as an outpatient to complete her overall treatment. Subjective:   Patient examined at bedside. She reports the antibiotic have provided significant improved to dental pain. Patient states she will follow up with dentist for dental caries evaluation.   No other complaints at this time      Assessment/Plan:   Active Problems:  Dental Abscess  - US of head neck soft tissue complete 04/15/2022 -negative for abscess   - Blood cultures - no growth thus far  - Continue IV Unasyn  Augmentin as outpatient Hypokalemia (4/14/2022)  Resolved      Microcytic anemia (4/14/2022)  - Hgb 6.6 -> 6.7   - MCV less than 80   - blood cultures - no growth thus far  - Iron Studies -low iron percentage and ferritin with increased TIBC. Iron supplementation.  - Consider blood transfusion if patient Hgb drop below 7 or if she becomes symptomatic      Elevated blood pressure affecting pregnancy in second trimester, antepartum (4/14/2022) (resolved)  - Dr. Vernie Goltz with OB following patient  - Continue Labetalol   - Maintain BP <150/90  - continue to monitor    Code Status: Full Code  POA/NOK: Mother: Tong Rios; [phone]    We will sign off for now. Please contact for any further questions or increasing higher level of care.     Current Facility-Administered Medications   Medication Dose Route Frequency    calcium carbonate (TUMS) chewable tablet 400 mg [elemental]  400 mg Oral Q6H PRN    benzocaine-menthol-zinc chloride (ORAJEL) 20-0.1-0.15 % mucosal gel   Mucous Membrane PRN    levETIRAcetam (KEPPRA) tablet 750 mg  750 mg Oral BID    sodium chloride (NS) flush 5-40 mL  5-40 mL IntraVENous Q8H    sodium chloride (NS) flush 5-40 mL  5-40 mL IntraVENous PRN    acetaminophen (TYLENOL) tablet 650 mg  650 mg Oral Q6H PRN    Or    acetaminophen (TYLENOL) suppository 650 mg  650 mg Rectal Q6H PRN    polyethylene glycol (MIRALAX) packet 17 g  17 g Oral DAILY PRN    ondansetron (ZOFRAN ODT) tablet 4 mg  4 mg Oral Q8H PRN    Or    ondansetron (ZOFRAN) injection 4 mg  4 mg IntraVENous Q6H PRN    labetaloL (NORMODYNE;TRANDATE) injection 10 mg  10 mg IntraVENous Q1H PRN    ampicillin-sulbactam (UNASYN) 3 g in 0.9% sodium chloride (MBP/ADV) 100 mL MBP  3 g IntraVENous Q6H    labetaloL (NORMODYNE) tablet 400 mg  400 mg Oral Q12H    oxyCODONE-acetaminophen (PERCOCET) 5-325 mg per tablet 1 Tablet  1 Tablet Oral Q4H PRN    zolpidem (AMBIEN) tablet 5 mg  5 mg Oral QHS PRN        REVIEW OF SYSTEMS    Review of Systems   Constitutional: Negative. HENT: Negative for ear discharge and ear pain. Left lower dental pain   Eyes: Negative. Respiratory: Negative for cough, shortness of breath and wheezing. Cardiovascular: Negative for chest pain and palpitations. Gastrointestinal: Negative for abdominal pain, nausea and vomiting. Genitourinary: Negative. Musculoskeletal: Negative. Skin: Negative. Neurological: Negative for headaches. Endo/Heme/Allergies: Negative. Psychiatric/Behavioral: Negative. Objective:     Visit Vitals  BP (!) 108/56   Pulse 100   Temp 97.9 °F (36.6 °C)   Resp 18   Ht 5' 2\" (1.575 m)   Wt 83.9 kg (185 lb)   SpO2 100%   Breastfeeding No   BMI 33.84 kg/m²      O2 Device: None (Room air)    Temp (24hrs), Av °F (36.7 °C), Min:97.9 °F (36.6 °C), Max:98.1 °F (36.7 °C)       0701 -  1900  In: 222 [P.O.:222]  Out: -   No intake/output data recorded. PHYSICAL EXAM:    Physical Exam  Vitals reviewed. HENT:      Mouth/Throat:      Mouth: Mucous membranes are moist.   Eyes:      Extraocular Movements: Extraocular movements intact. Cardiovascular:      Rate and Rhythm: Normal rate and regular rhythm. Heart sounds: Normal heart sounds. Pulmonary:      Effort: Pulmonary effort is normal.      Breath sounds: Normal breath sounds. Abdominal:      Tenderness: There is no abdominal tenderness. Genitourinary:     Comments: 33 weeks and 4 days pregnant  Musculoskeletal:         General: Normal range of motion. Cervical back: No tenderness. Skin:     General: Skin is warm. Neurological:      Mental Status: She is alert and oriented to person, place, and time.           Data Review    Recent Results (from the past 24 hour(s))   URIC ACID    Collection Time: 22  9:00 AM   Result Value Ref Range    Uric acid 5.3 2.6 - 6.0 mg/dL   METABOLIC PANEL, COMPREHENSIVE    Collection Time: 22  9:00 AM   Result Value Ref Range    Sodium 138 136 - 145 mmol/L    Potassium 3.4 (L) 3.5 - 5.1 mmol/L    Chloride 104 97 - 108 mmol/L    CO2 25 21 - 32 mmol/L    Anion gap 9 5 - 15 mmol/L    Glucose 123 (H) 65 - 100 mg/dL    BUN 4 (L) 6 - 20 mg/dL    Creatinine 0.57 0.55 - 1.02 mg/dL    BUN/Creatinine ratio 7 (L) 12 - 20      GFR est AA >60 >60 ml/min/1.73m2    GFR est non-AA >60 >60 ml/min/1.73m2    Calcium 8.5 8.5 - 10.1 mg/dL    Bilirubin, total 0.3 0.2 - 1.0 mg/dL    AST (SGOT) 27 15 - 37 U/L    ALT (SGPT) 16 12 - 78 U/L    Alk. phosphatase 149 (H) 45 - 117 U/L    Protein, total 6.7 6.4 - 8.2 g/dL    Albumin 2.8 (L) 3.5 - 5.0 g/dL    Globulin 3.9 2.0 - 4.0 g/dL    A-G Ratio 0.7 (L) 1.1 - 2.2     PROTEIN/CREATININE RATIO, URINE    Collection Time: 04/17/22  9:00 AM   Result Value Ref Range    Protein, urine random 47 (H) 0.0 - 11.9 mg/dL    Creatinine, urine 126.00 mg/dL    Protein/Creat. urine Ratio 0.4     CBC WITH AUTOMATED DIFF    Collection Time: 04/17/22  9:00 AM   Result Value Ref Range    WBC 7.8 3.6 - 11.0 K/uL    RBC 2.90 (L) 3.80 - 5.20 M/uL    HGB 6.6 (L) 11.5 - 16.0 g/dL    HCT 22.7 (L) 35.0 - 47.0 %    MCV 78.3 (L) 80.0 - 99.0 FL    MCH 22.8 (L) 26.0 - 34.0 PG    MCHC 29.1 (L) 30.0 - 36.5 g/dL    RDW 18.3 (H) 11.5 - 14.5 %    PLATELET 137 935 - 233 K/uL    MPV 9.3 8.9 - 12.9 FL    NRBC 2.3 (H) 0.0  WBC    ABSOLUTE NRBC 0.18 (H) 0.00 - 0.01 K/uL    NEUTROPHILS 79 (H) 32 - 75 %    LYMPHOCYTES 15 12 - 49 %    MONOCYTES 6 5 - 13 %    EOSINOPHILS 0 0 - 7 %    BASOPHILS 0 0 - 1 %    IMMATURE GRANULOCYTES 0 %    ABS. NEUTROPHILS 6.1 1.8 - 8.0 K/UL    ABS. LYMPHOCYTES 1.2 0.8 - 3.5 K/UL    ABS. MONOCYTES 0.5 0.0 - 1.0 K/UL    ABS. EOSINOPHILS 0.0 0.0 - 0.4 K/UL    ABS. BASOPHILS 0.0 0.0 - 0.1 K/UL    ABS. IMM.  GRANS. 0.0 K/UL    DF Manual      RBC COMMENTS Microcytosis  1+       CBC W/O DIFF    Collection Time: 04/17/22 10:15 AM   Result Value Ref Range    WBC 8.1 3.6 - 11.0 K/uL    RBC 2.85 (L) 3.80 - 5.20 M/uL    HGB 6.7 (L) 11.5 - 16.0 g/dL    HCT 22.6 (L) 35.0 - 47.0 %    MCV 79.3 (L) 80.0 - 99.0 FL    MCH 23.5 (L) 26.0 - 34.0 PG    MCHC 29.6 (L) 30.0 - 36.5 g/dL    RDW 18.3 (H) 11.5 - 14.5 %    PLATELET 409 756 - 379 K/uL    MPV 9.7 8.9 - 12.9 FL    NRBC 2.5 (H) 0.0  WBC    ABSOLUTE NRBC 0.20 (H) 0.00 - 0.01 K/uL       US HEAD NECK SOFT TISSUE   Final Result   1. No abnormal fluid collection is evident by sonogram. For persistent clinical   concern, recommend dedicated CT, preferably with IV contrast.      [facility]   Final Result   1. Single live intrauterine pregnancy 33 weeks 2 days, estimated date delivery   6/1/2022.   2. Amniotic fluid index of 6.14 cm, is just above the 5th percentile for   estimated gestational age of 29 weeks 2 days. _____________________________________________________________________________  Time spent in direct care including coordination of service, review of data and examination: > 35 minutes    ______________________________________________________________________________    Wyona Better    This is dictation was done by dragon, computer voice recognition software. Quite often unanticipated grammatical, syntax, homophones and other interpretive errors or inadvertently transcribed by the computer software. Please excuse errors that have escaped final proofreading. Thank you.

## 2022-04-18 PROCEDURE — 74011250637 HC RX REV CODE- 250/637: Performed by: OBSTETRICS & GYNECOLOGY

## 2022-04-18 PROCEDURE — 74011250636 HC RX REV CODE- 250/636: Performed by: INTERNAL MEDICINE

## 2022-04-18 PROCEDURE — 74011250636 HC RX REV CODE- 250/636: Performed by: OBSTETRICS & GYNECOLOGY

## 2022-04-18 PROCEDURE — 74011000250 HC RX REV CODE- 250: Performed by: OBSTETRICS & GYNECOLOGY

## 2022-04-18 PROCEDURE — 65410000002 HC RM PRIVATE OB

## 2022-04-18 PROCEDURE — 99231 SBSQ HOSP IP/OBS SF/LOW 25: CPT | Performed by: OBSTETRICS & GYNECOLOGY

## 2022-04-18 PROCEDURE — 74011250637 HC RX REV CODE- 250/637: Performed by: INTERNAL MEDICINE

## 2022-04-18 PROCEDURE — 74011000250 HC RX REV CODE- 250: Performed by: INTERNAL MEDICINE

## 2022-04-18 PROCEDURE — 74011000258 HC RX REV CODE- 258: Performed by: OBSTETRICS & GYNECOLOGY

## 2022-04-18 RX ORDER — BUTALBITAL, ACETAMINOPHEN AND CAFFEINE 50; 325; 40 MG/1; MG/1; MG/1
2 TABLET ORAL
Status: DISCONTINUED | OUTPATIENT
Start: 2022-04-18 | End: 2022-04-20

## 2022-04-18 RX ORDER — LOPERAMIDE HYDROCHLORIDE 2 MG/1
4 CAPSULE ORAL ONCE
Status: COMPLETED | OUTPATIENT
Start: 2022-04-18 | End: 2022-04-18

## 2022-04-18 RX ORDER — GUAIFENESIN 100 MG/5ML
200 SOLUTION ORAL
Status: DISCONTINUED | OUTPATIENT
Start: 2022-04-18 | End: 2022-04-20

## 2022-04-18 RX ADMIN — LABETALOL HYDROCHLORIDE 400 MG: 200 TABLET, FILM COATED ORAL at 09:03

## 2022-04-18 RX ADMIN — GUAIFENESIN 200 MG: 200 SOLUTION ORAL at 06:05

## 2022-04-18 RX ADMIN — AMPICILLIN SODIUM AND SULBACTAM SODIUM 3 G: 2; 1 INJECTION, POWDER, FOR SOLUTION INTRAMUSCULAR; INTRAVENOUS at 01:21

## 2022-04-18 RX ADMIN — OXYCODONE AND ACETAMINOPHEN 1 TABLET: 325; 5 TABLET ORAL at 21:23

## 2022-04-18 RX ADMIN — OXYCODONE AND ACETAMINOPHEN 1 TABLET: 325; 5 TABLET ORAL at 09:12

## 2022-04-18 RX ADMIN — AMPICILLIN SODIUM AND SULBACTAM SODIUM 3 G: 2; 1 INJECTION, POWDER, FOR SOLUTION INTRAMUSCULAR; INTRAVENOUS at 06:04

## 2022-04-18 RX ADMIN — SODIUM CHLORIDE, PRESERVATIVE FREE 10 ML: 5 INJECTION INTRAVENOUS at 18:42

## 2022-04-18 RX ADMIN — LEVETIRACETAM 750 MG: 250 TABLET, FILM COATED ORAL at 09:13

## 2022-04-18 RX ADMIN — ONDANSETRON 4 MG: 2 INJECTION INTRAMUSCULAR; INTRAVENOUS at 09:12

## 2022-04-18 RX ADMIN — LEVETIRACETAM 750 MG: 250 TABLET, FILM COATED ORAL at 21:16

## 2022-04-18 RX ADMIN — LOPERAMIDE HYDROCHLORIDE 4 MG: 2 CAPSULE ORAL at 21:23

## 2022-04-18 RX ADMIN — AMPICILLIN SODIUM AND SULBACTAM SODIUM 3 G: 2; 1 INJECTION, POWDER, FOR SOLUTION INTRAMUSCULAR; INTRAVENOUS at 18:42

## 2022-04-18 RX ADMIN — SODIUM CHLORIDE, PRESERVATIVE FREE 20 MG: 5 INJECTION INTRAVENOUS at 09:03

## 2022-04-18 RX ADMIN — OXYCODONE AND ACETAMINOPHEN 1 TABLET: 325; 5 TABLET ORAL at 01:12

## 2022-04-18 RX ADMIN — SODIUM CHLORIDE, PRESERVATIVE FREE 20 MG: 5 INJECTION INTRAVENOUS at 21:17

## 2022-04-18 RX ADMIN — BUTALBITAL, ACETAMINOPHEN, AND CAFFEINE 2 TABLET: 50; 325; 40 TABLET ORAL at 17:51

## 2022-04-18 RX ADMIN — AMPICILLIN SODIUM AND SULBACTAM SODIUM 3 G: 2; 1 INJECTION, POWDER, FOR SOLUTION INTRAMUSCULAR; INTRAVENOUS at 12:32

## 2022-04-18 RX ADMIN — LABETALOL HYDROCHLORIDE 400 MG: 200 TABLET, FILM COATED ORAL at 21:16

## 2022-04-18 NOTE — PROGRESS NOTES
OB PROGRESS NOTE    PROBLEM:  -Intrauterine pregnancy at 33-4/7-week gestation by late, third trimester ultrasound  -Proteinuria during pregnancy  -Amniotic fluid index borderline low  -Pregnancy with uncertain dates in third trimester  -Advanced maternal age  -Positive urine drug screen for barbiturates, not on patient's medication list  -No prenatal care in current pregnancy in third trimester  -Dental abscess  -History of seizure disorder  -Gestational hypertension suspected, chronic hypertension cannot be ruled out  -Anemia during pregnancy in third trimester      SUBJECTIVE: Patient is doing better, improving. VITALS:  Visit Vitals  BP (!) 154/98   Pulse (!) 107   Temp 97.8 °F (36.6 °C)   Resp 16   Ht 5' 2\" (1.575 m)   Wt 83.9 kg (185 lb)   SpO2 100%   Breastfeeding No   BMI 33.84 kg/m²     HEART: Regular rhythm, no murmur  LUNGS: Clear to auscultation at both fields  ABDOMEN: Gravid, fetal heart tones present     NST: Reactive    ASSESSMENT: Patient consulted today with Dr. Sarah Flood ([facility] on call). In view of the possibility of chronic hypertension with superimposed preeclampsia with severe feature, possible growth restriction and borderline oligohydramnios, delivery at 34 weeks is recommended, if there is no prior deterioration.     PLAN:  Continue IV antibiotics  Induction at 34 weeks planned

## 2022-04-18 NOTE — PROGRESS NOTES
OB PROGRESS NOTE    PROBLEM:  Pregnancy at 33-5/7-week gestation  Preeclampsia with severe features  Amniotic fluid index borderline low  Dental abscess, improving    SUBJECTIVE: Patient is doing better, improving, has no complaints today. VITALS:  Visit Vitals  /72 (BP 1 Location: Left upper arm, BP Patient Position: At rest)   Pulse (!) 103   Temp 98 °F (36.7 °C)   Resp 16   Ht 5' 2\" (1.575 m)   Wt 83.9 kg (185 lb)   SpO2 96%   Breastfeeding No   BMI 33.84 kg/m²     HEART: Regular rhythm, no murmur  LUNGS: Clear to auscultation at both fields  ABDOMEN: Gravid, fetal heart tones present    NST: Reactive    ASSESSMENT: 39years old -0-1-3 with pregnancy at 33-5/7-week gestation with no prenatal care with preeclampsia with severe features. Blood pressure has been stable after medical treatment.     PLAN:  Continue IV antibiotic  Planned induction at 34 weeks

## 2022-04-18 NOTE — PROGRESS NOTES
[facility] [address] [facility] [address]  Dept: [phone]  Dept Fax: [phone]  Loc: [phone]    Visit Date: 11/11/2020    Mirta Hancock is a 71 y.o. male who presents today for:  Chief Complaint   Patient presents with    Post-Op Check     s/p Robotic repair, bilateral inguinal hernias with mesh (large 3DMax mesh)-9/24/2020 Last seen in the office on 10/21/2020       HPI:     HPI    Iza Noyola is a 70-year old male patient who presents today for follow up status post robotic assisted bilateral inguinal hernia with mesh on 9/24/20 with Dr. Tamika Carias all narcotics.  Appetite back to normal. No nausea or vomiting. No fever, chills, or sweats. Incisions are healing well without signs of infection. Abdominal incisions non-tender. Denies any scrotal swelling. Bilateral groins without tenderness, pain or swelling. No issues urinating. Bowels are back to normal. No stool softener use. No SOB or chest pain. No lightheadedness or dizziness. Tolerating activity pretty well.      Past Medical History:   Diagnosis Date    Diabetes ([address]     no meds    GERD (gastroesophageal reflux disease)     Hyperlipidemia     Hypertension     Inguinal hernia 2020    right      Past Surgical History:   Procedure Laterality Date    COLONOSCOPY  2008    Wilma    HERNIA REPAIR Right 9/24/2020    ROBOTIC BILATERAL INGUINAL HERNIA REPAIR WITH MESH performed by Jey Barrios MD at [facility]         Family History   Problem Relation Age of Onset    Arthritis Mother     Hypertension Mother     Stroke Father     Hypertension Father     No Known Problems Sister     No Known Problems Brother     No Known Problems Maternal Grandmother     Heart Attack Maternal Grandfather         42's    No Known Problems Paternal Grandmother     No Known Problems Paternal Grandfather        Social History     Tobacco Use    Smoking status: Current CM received consult regarding patient having questions for CM. CM met with the patient at the bedside. Patient reported she is currently living in an extended stay hotel with her mother and two daughters ages 11 and 10. She stated they have been living at the hotel for the past year. Patient voiced concerns about being induced on Thursday and not being ready or having everything needed for the baby. Patient indicated she does not yet have a crib or car seat and she has been trying to find another place to live as she does not want to take a  to the hotel. Patient is inquiring if [facility] can assist with any of this. After speaking with patient further she stated she has money she is just unable to leave the hospital and purchase what she needs. CM explained with her being induced at 34 weeks the baby will likely remain in the hospital for at least a week which should allow her time to purchase necessary items. Patient then reported she has applied for several apartments in different areas but has been declined either due to her credit or a past felony charge. CM explained we will likely not be able to find her housing as she is already on wait lists or applied and been declined to apartment/housing areas. Patient acknowledged her understanding of this and was provided with a resource packet for financial assistance and housing. CM will continue to follow and assist as able. Some Day Smoker     Types: Cigars    Smokeless tobacco: Never Used   Substance Use Topics    Alcohol use: Yes     Comment: occ. Current Outpatient Medications   Medication Sig Dispense Refill    losartan (COZAAR) 50 MG tablet Take 50 mg by mouth daily      aspirin 81 MG EC tablet Take 81 mg by mouth daily      rosuvastatin (CRESTOR) 10 MG tablet Take 10 mg by mouth daily       esomeprazole (NEXIUM) 40 MG delayed release capsule Take 40 mg by mouth every morning (before breakfast)        No current facility-administered medications for this visit. Allergies   Allergen Reactions    Actos [Pioglitazone] Swelling     Hands,fingers,lips tongue    Ramipril Swelling    Sulfa Antibiotics Hives       Subjective:     Review of Systems   Constitutional: Negative for activity change, appetite change, chills, diaphoresis, fatigue, fever and unexpected weight change. HENT: Negative for congestion, dental problem, drooling, ear discharge, ear pain, facial swelling, hearing loss, mouth sores, nosebleeds, postnasal drip, rhinorrhea, sinus pressure, sneezing, sore throat, tinnitus, trouble swallowing and voice change. Eyes: Negative for photophobia, pain, discharge, redness, itching and visual disturbance. Respiratory: Negative for apnea, cough, choking, chest tightness, shortness of breath, wheezing and stridor. Cardiovascular: Negative for chest pain, palpitations and leg swelling. Gastrointestinal: Negative for abdominal distention, abdominal pain, anal bleeding, blood in stool, constipation, diarrhea, nausea, rectal pain and vomiting. Genitourinary: Negative for decreased urine volume, difficulty urinating, discharge, dysuria, enuresis, flank pain, frequency, genital sores, hematuria, penile pain, penile swelling, scrotal swelling, testicular pain and urgency. Musculoskeletal: Negative for arthralgias, back pain, gait problem, joint swelling, myalgias, neck pain and neck stiffness.    Skin: Negative for color change, pallor, rash and wound. Neurological: Negative for dizziness, tremors, seizures, syncope, facial asymmetry, speech difficulty, weakness, light-headedness, numbness and headaches. Hematological: Negative for adenopathy. Does not bruise/bleed easily. Psychiatric/Behavioral: Negative for agitation, behavioral problems, confusion, decreased concentration, dysphoric mood, hallucinations, self-injury, sleep disturbance and suicidal ideas. The patient is not nervous/anxious and is not hyperactive. Objective:   /80 (Site: Left Upper Arm, Position: Sitting, Cuff Size: Medium Adult)   Pulse 52   Temp 97.4 °F (36.3 °C) (Temporal)   Resp 14   Ht 5' 8\" (1.727 m)   Wt 176 lb (79.8 kg)   SpO2 98%   BMI 26.76 kg/m²     Physical Exam  Vitals signs reviewed. Constitutional:       General: He is not in acute distress. Appearance: Normal appearance. He is well-developed. He is not ill-appearing or toxic-appearing. HENT:      Head: Normocephalic and atraumatic. Right Ear: Hearing and external ear normal.      Left Ear: Hearing and external ear normal.      Nose: Nose normal.      Mouth/Throat:      Mouth: Mucous membranes are not pale, not dry and not cyanotic. Eyes:      General: Lids are normal.   Neck:      Musculoskeletal: Normal range of motion and neck supple. Trachea: Trachea and phonation normal.   Cardiovascular:      Rate and Rhythm: Normal rate and regular rhythm. Pulses: Normal pulses. Heart sounds: S1 normal and S2 normal.   Pulmonary:      Effort: Pulmonary effort is normal. No tachypnea, bradypnea, accessory muscle usage or respiratory distress. Breath sounds: Normal breath sounds. No decreased breath sounds, wheezing or rales. Chest:      Chest wall: No tenderness. Abdominal:      General: Bowel sounds are normal. There is no distension. Palpations: Abdomen is soft. There is no mass. Tenderness:  There is no abdominal tenderness. Musculoskeletal: Normal range of motion. General: No tenderness. Skin:     General: Skin is warm and dry. Findings: No abrasion, bruising, burn, ecchymosis, erythema, laceration, lesion or rash. Neurological:      Mental Status: He is alert and oriented to person, place, and time. Motor: No tremor, atrophy or abnormal muscle tone. Coordination: Coordination normal.      Gait: Gait normal.      Deep Tendon Reflexes: Reflexes are normal and symmetric. Psychiatric:         Speech: Speech normal.         Behavior: Behavior normal.         Thought Content: Thought content normal.        There is no problem list on file for this patient. Assessment:     1. Status post robotic assisted bilateral inguinal hernia with mesh    Plan:     1. Abdomen benign. Incisions are healing well without signs of infection. 2. No bulge or instability noted at old hernia sites  3. Appetite and bowel function doing well   4. Off narcotics. Tylenol as needed for discomfort. 5. Lifting/activity restrictions discussed with patient. Questions answered. Off work for until 11/30/20 due lifting restrictions. 6. Follow up as needed. Signs and symptoms reviewed with patient that would be concerning and need him to return to office for re-evaluation. Patient states he will call if he has questions or concerns.       Electronically signed by KENNY Estrada CNP on 11/12/2020 at 12:59 PM

## 2022-04-18 NOTE — PROGRESS NOTES
Problem: Pain  Goal: *Control of Pain  Outcome: Progressing Towards Goal     Problem: Patient Education: Go to Patient Education Activity  Goal: Patient/Family Education  Outcome: Progressing Towards Goal     Problem: Falls - Risk of  Goal: *Absence of Falls  Description: Document Keramargarito Azul Fall Risk and appropriate interventions in the flowsheet.   Outcome: Progressing Towards Goal  Note: Fall Risk Interventions:            Medication Interventions: Patient to call before getting OOB,Teach patient to arise slowly,Assess postural VS orthostatic hypotension                   Problem: Patient Education: Go to Patient Education Activity  Goal: Patient/Family Education  Outcome: Progressing Towards Goal     Problem: Hypertension  Goal: *Blood pressure within specified parameters  Outcome: Progressing Towards Goal  Goal: *Fluid volume balance  Outcome: Progressing Towards Goal  Goal: *Labs within defined limits  Outcome: Progressing Towards Goal     Problem: Patient Education: Go to Patient Education Activity  Goal: Patient/Family Education  Outcome: Progressing Towards Goal

## 2022-04-19 ENCOUNTER — APPOINTMENT (OUTPATIENT)
Dept: MRI IMAGING | Age: 37
DRG: 560 | End: 2022-04-19
Attending: PSYCHIATRY & NEUROLOGY
Payer: MEDICAID

## 2022-04-19 PROCEDURE — 74011000258 HC RX REV CODE- 258: Performed by: OBSTETRICS & GYNECOLOGY

## 2022-04-19 PROCEDURE — 70551 MRI BRAIN STEM W/O DYE: CPT

## 2022-04-19 PROCEDURE — 74011250636 HC RX REV CODE- 250/636: Performed by: OBSTETRICS & GYNECOLOGY

## 2022-04-19 PROCEDURE — 74011250637 HC RX REV CODE- 250/637: Performed by: INTERNAL MEDICINE

## 2022-04-19 PROCEDURE — 65410000002 HC RM PRIVATE OB

## 2022-04-19 PROCEDURE — 70544 MR ANGIOGRAPHY HEAD W/O DYE: CPT

## 2022-04-19 PROCEDURE — 87070 CULTURE OTHR SPECIMN AEROBIC: CPT

## 2022-04-19 PROCEDURE — 74011250637 HC RX REV CODE- 250/637: Performed by: OBSTETRICS & GYNECOLOGY

## 2022-04-19 PROCEDURE — 74011000250 HC RX REV CODE- 250: Performed by: INTERNAL MEDICINE

## 2022-04-19 PROCEDURE — 74011000250 HC RX REV CODE- 250: Performed by: OBSTETRICS & GYNECOLOGY

## 2022-04-19 RX ORDER — LOPERAMIDE HYDROCHLORIDE 2 MG/1
2 CAPSULE ORAL ONCE
Status: COMPLETED | OUTPATIENT
Start: 2022-04-19 | End: 2022-04-19

## 2022-04-19 RX ADMIN — AMPICILLIN SODIUM AND SULBACTAM SODIUM 3 G: 2; 1 INJECTION, POWDER, FOR SOLUTION INTRAMUSCULAR; INTRAVENOUS at 06:32

## 2022-04-19 RX ADMIN — SODIUM CHLORIDE, PRESERVATIVE FREE 20 MG: 5 INJECTION INTRAVENOUS at 21:11

## 2022-04-19 RX ADMIN — SODIUM CHLORIDE, PRESERVATIVE FREE 20 MG: 5 INJECTION INTRAVENOUS at 09:05

## 2022-04-19 RX ADMIN — SODIUM CHLORIDE, PRESERVATIVE FREE 10 ML: 5 INJECTION INTRAVENOUS at 06:32

## 2022-04-19 RX ADMIN — ZOLPIDEM TARTRATE 5 MG: 5 TABLET ORAL at 21:56

## 2022-04-19 RX ADMIN — AMPICILLIN SODIUM AND SULBACTAM SODIUM 3 G: 2; 1 INJECTION, POWDER, FOR SOLUTION INTRAMUSCULAR; INTRAVENOUS at 00:51

## 2022-04-19 RX ADMIN — LABETALOL HYDROCHLORIDE 400 MG: 200 TABLET, FILM COATED ORAL at 09:06

## 2022-04-19 RX ADMIN — SODIUM CHLORIDE, PRESERVATIVE FREE 10 ML: 5 INJECTION INTRAVENOUS at 14:00

## 2022-04-19 RX ADMIN — OXYCODONE AND ACETAMINOPHEN 1 TABLET: 325; 5 TABLET ORAL at 05:43

## 2022-04-19 RX ADMIN — AMPICILLIN SODIUM AND SULBACTAM SODIUM 3 G: 2; 1 INJECTION, POWDER, FOR SOLUTION INTRAMUSCULAR; INTRAVENOUS at 12:08

## 2022-04-19 RX ADMIN — OXYCODONE AND ACETAMINOPHEN 1 TABLET: 325; 5 TABLET ORAL at 18:46

## 2022-04-19 RX ADMIN — AMPICILLIN SODIUM AND SULBACTAM SODIUM 3 G: 2; 1 INJECTION, POWDER, FOR SOLUTION INTRAMUSCULAR; INTRAVENOUS at 18:38

## 2022-04-19 RX ADMIN — LOPERAMIDE HYDROCHLORIDE 2 MG: 2 CAPSULE ORAL at 00:53

## 2022-04-19 RX ADMIN — BUTALBITAL, ACETAMINOPHEN, AND CAFFEINE 2 TABLET: 50; 325; 40 TABLET ORAL at 00:16

## 2022-04-19 RX ADMIN — ZOLPIDEM TARTRATE 5 MG: 5 TABLET ORAL at 00:53

## 2022-04-19 RX ADMIN — LEVETIRACETAM 750 MG: 250 TABLET, FILM COATED ORAL at 09:06

## 2022-04-19 RX ADMIN — LEVETIRACETAM 750 MG: 250 TABLET, FILM COATED ORAL at 21:08

## 2022-04-19 RX ADMIN — LABETALOL HYDROCHLORIDE 400 MG: 200 TABLET, FILM COATED ORAL at 21:07

## 2022-04-19 RX ADMIN — LOPERAMIDE HYDROCHLORIDE 2 MG: 2 CAPSULE ORAL at 21:15

## 2022-04-19 RX ADMIN — BUTALBITAL, ACETAMINOPHEN, AND CAFFEINE 2 TABLET: 50; 325; 40 TABLET ORAL at 07:37

## 2022-04-19 NOTE — CONSULTS
Consult Date: 4/19/2022    Consults Ms. Bety Mcpherson is a 39year old woman 33 weeks pregnant with severe pre-eclampsia now BP well controlled who has new onset headaches since last night. She says when she bends forward or coughs she will have a pressure like frontal headache. This resolves if she sits up. She denies any visual disturbances. She does have some nausea and vomiting. Tylenol, percocet has not helped.      Subjective     Past Medical History:   Diagnosis Date    Anemia     Chlamydia     Depression     Epilepsy ([address]     Essential hypertension     Hernia     Hypokalemia 4/14/2022    Psychiatric problem     Seizure [facility])       Past Surgical History:   Procedure Laterality Date    HX ENDOSCOPY       Family History   Problem Relation Age of Onset    Other Mother         growth/tumor on brain   Shaq Carbon Migraines Mother     Hypertension Mother       Social History     Tobacco Use    Smoking status: Current Every Day Smoker     Packs/day: 0.25    Smokeless tobacco: Never Used   Substance Use Topics    Alcohol use: Not Currently       Current Facility-Administered Medications   Medication Dose Route Frequency Provider Last Rate Last Admin    guaiFENesin (ROBITUSSIN) 100 mg/5 mL oral liquid 200 mg  200 mg Oral Q6H PRN Laureen Patel MD   200 mg at 04/18/22 0605    butalbital-acetaminophen-caffeine (FIORICET, ESGIC) -40 mg per tablet 2 Tablet  2 Tablet Oral Q6H PRN Duarte Yanez MD   2 Tablet at 04/19/22 0737    calcium carbonate (TUMS) chewable tablet 400 mg [elemental]  400 mg Oral Q6H PRN Laureen Patel MD   400 mg at 04/17/22 1726    famotidine (PF) (PEPCID) 20 mg in 0.9% sodium chloride 10 mL injection  20 mg IntraVENous BID Dion Ramos MD   20 mg at 04/19/22 0905    benzocaine-menthol-zinc chloride (ORAJEL) 20-0.1-0.15 % mucosal gel   Mucous Membrane PRN Dion Ramos MD   Given at 04/16/22 2042    levETIRAcetam (KEPPRA) tablet 750 mg  750 mg Oral BID Gayathri Ponce MD   750 mg at 04/19/22 9671    sodium chloride (NS) flush 5-40 mL  5-40 mL IntraVENous Q8H Leobardo Lopez MD   10 mL at 04/18/22 1842    sodium chloride (NS) flush 5-40 mL  5-40 mL IntraVENous PRN Gayathri Ponce MD   10 mL at 04/19/22 3702    acetaminophen (TYLENOL) tablet 650 mg  650 mg Oral Q6H PRN Gayathri Ponce MD   650 mg at 04/17/22 2105    Or    acetaminophen (TYLENOL) suppository 650 mg  650 mg Rectal Q6H PRN Leobardo Lopez MD        polyethylene glycol (MIRALAX) packet 17 g  17 g Oral DAILY PRN Leobardo Lopez MD        ondansetron (ZOFRAN ODT) tablet 4 mg  4 mg Oral Q8H PRN Gayathri Ponce MD   4 mg at 04/17/22 2149    Or    ondansetron (ZOFRAN) injection 4 mg  4 mg IntraVENous Q6H PRN Gayathri Ponce MD   4 mg at 04/18/22 0912    labetaloL (NORMODYNE;TRANDATE) injection 10 mg  10 mg IntraVENous Q1H PRN Leobardo Lopez MD        ampicillin-sulbactam (UNASYN) 3 g in 0.9% sodium chloride (MBP/ADV) 100 mL MBP  3 g IntraVENous Q6H Kati Vela  mL/hr at 04/19/22 0632 3 g at 04/19/22 3674    labetaloL (NORMODYNE) tablet 400 mg  400 mg Oral Q12H Mildred Dockery MD   400 mg at 04/19/22 0906    oxyCODONE-acetaminophen (PERCOCET) 5-325 mg per tablet 1 Tablet  1 Tablet Oral Q4H PRN Kati Vela MD   1 Tablet at 04/19/22 0543    zolpidem (AMBIEN) tablet 5 mg  5 mg Oral QHS PRN Kati Vela MD   5 mg at 04/19/22 0053        Review of Systems   Neurological: Positive for headaches. All other systems reviewed and are negative. Objective     Vital signs for last 24 hours:  Visit Vitals  /78   Pulse 92   Temp 98.1 °F (36.7 °C)   Resp 20   Ht 5' 2\" (1.575 m)   Wt 83.9 kg (185 lb)   SpO2 97%   Breastfeeding No   BMI 33.84 kg/m²       Intake/Output this shift:  Current Shift: No intake/output data recorded. Last 3 Shifts: No intake/output data recorded.     Data Review:   No results found for this or any previous visit (from the past 24 hour(s)). Physical Exam    Neuro Physical Exam      General: Well developed, well nourished. Patient in no apparent distress. Cardiac: Regular rate and rhythm       Neurological Exam:  Mental Status: Awake, alert, oriented, normal speech   Cranial Nerves:   Intact visual fields. PERRL, EOM's full, no nystagmus, no ptosis. Facial sensation is normal. Facial movement is symmetric. Palate is midline. Normal sternocleidomastoid strength. Tongue is midline. Hearing is intact bilaterally. Motor:  5/5 strength in upper and lower proximal and distal muscles. Normal bulk and tone. Reflexes:   Deep tendon reflexes 3+/4 and symmetrical.   Sensory:   Normal to light touch   Gait:  Normal gait. Tremor:   No tremor noted. Cerebellar:  No cerebellar signs present. Babinski:      Down b/l     Assessment and Plan: Ms. Rebecca Magaña is a 39year old woman 33 weeks pregnant, pre-eclampsia who comes in with new onset headaches since last night. Given her pre disposition to hypercoagulability given pregnancy cerebral vein thrombosis needs to be ruled out. Will do mri brain and mrv w/o contrast. Cont fioricet as needed.      Thank you

## 2022-04-19 NOTE — PROGRESS NOTES
Progress Note    Patient: Kristi Liang MRN: [id number]  SSN: [id number]    YOB: 1985  Age: 39 y.o.   Sex: female      Admit Date: 4/14/2022    LOS: 4 days     Subjective:     Patient with continued headache, preeclampsia with severe features    Objective:     Vitals:    04/19/22 0115 04/19/22 0546 04/19/22 0631 04/19/22 0730   BP: 120/70 (!) 142/73 134/82 131/78   Pulse:  96 92 92   Resp:  17  20   Temp:  98.2 °F (36.8 °C)  98.1 °F (36.7 °C)   SpO2:  97%     Weight:       Height:            Physical Exam:  Heart is with regular rate and rhythm  Lungs are clear  Abdomen is gravid  Fetal heart tones are reactive  Lab/Data Review:  No new lab    Assessment:     Active Problems:    Seizure disorder ([address] (11/30/2016)      Dental abscess (4/15/2022)      Pregnancy (4/15/2022)      No prenatal care in current pregnancy in third trimester (4/16/2022)      Anemia during pregnancy in third trimester (4/16/2022)      Positive urine drug screen for barbiturates (4/16/2022)      Pregnancy with uncertain dates in third trimester (4/16/2022)      Multigravida of advanced maternal age in third trimester (4/16/2022)      IZZY (amniotic fluid index) borderline low (4/16/2022)      Proteinuria during pregnancy (4/16/2022)        Plan:     Induction of labor at 34 weeks, will begin Cytotec at midnight    Signed By: Raina Ramírez MD     April 19, 2022

## 2022-04-19 NOTE — PROGRESS NOTES
1910: Bedside shift report received from EL Aguilar. Pt alert in bed resting. Pt reports discomfort to rectum/anus from having diarrhea, reports hemorrhoids as well. 2115: Pt has had 3 episode of loose stool since assuming care. Imodium given as per order. 2130: No hemorrhoid noted as was reported by pt. Pt reported that one of her daughters was positive for GBS and meningitis about a week after birth and spent extended time in NICU.    2328: Pt resting queitly in bed. EFM recommence. 0023: Cytotec 25 mcg given as ordered oral/sublingually. EFM strip reactive. 9755: Dr. Azul Mitchell called unit to check on pt. MD updated about pt, To hold next dose of cytotec. Informed about Hb that has being trending down. Order received for venofer.    0602: SVE cervix 2-3cm dilated 60%effaced station -2.     0337: Bedside shift report given to LONG Viera and Nichole King Rn

## 2022-04-19 NOTE — PROGRESS NOTES
1915 Bedside report received from MERVIN Benavides pt off efm. Up to bathroom    1930 Ice chips provided. 1945 pt to bathroom - reports diarrhea , states new today. 2020 Dr Lonny Bradford aware of 2 episodes of diarrhea in last hr.  Order received for imodium capsule and c-diff screen. Attending made aware c-diff testing protocol would accommodate currently. Will pull policy     0948 C-diff worksheet obtained, Dr Lonny Bradford reviewed. Will hold for now, and see if imodium helps. 2115 Dr Lonny Bradford at nurses station, aware of last BP reading, okay to continue 400 mg Labetolol dose. 0000 Pt requesting Fioricet for headache. 0016 Fioricet given. Patient states had one more loose stool. 0025 2 mg Imodium dose ordered, awaiting arrival to unit, request to pharmacy sent. 0051 iv antibiotics given. 0130 efm and toco tracing obtained. 0205 IV saline locked. Pt states she is feeling better, reports less loose stools. Pt to left side, for comfort. 0530 Patient requesting pain medication for headache.    0546 VS taken, pt aware will give antibiotics at 0630. Ice and water given. 0630 iv antibiotics given.  efm obtained.      3210 Report given El Borrero RN

## 2022-04-20 ENCOUNTER — ANESTHESIA EVENT (OUTPATIENT)
Dept: LABOR AND DELIVERY | Age: 37
DRG: 560 | End: 2022-04-20
Payer: MEDICAID

## 2022-04-20 ENCOUNTER — ANESTHESIA (OUTPATIENT)
Dept: LABOR AND DELIVERY | Age: 37
DRG: 560 | End: 2022-04-20
Payer: MEDICAID

## 2022-04-20 VITALS — DIASTOLIC BLOOD PRESSURE: 84 MMHG | OXYGEN SATURATION: 100 % | SYSTOLIC BLOOD PRESSURE: 126 MMHG | HEART RATE: 94 BPM

## 2022-04-20 LAB
ALBUMIN SERPL-MCNC: 2.4 G/DL (ref 3.5–5)
ALBUMIN/GLOB SERPL: 0.6 {RATIO} (ref 1.1–2.2)
ALP SERPL-CCNC: 139 U/L (ref 45–117)
ALT SERPL-CCNC: 17 U/L (ref 12–78)
ANION GAP SERPL CALC-SCNC: 7 MMOL/L (ref 5–15)
AST SERPL W P-5'-P-CCNC: 25 U/L (ref 15–37)
BACTERIA SPEC CULT: NORMAL
BASOPHILS # BLD: 0.1 K/UL (ref 0–0.1)
BASOPHILS NFR BLD: 1 % (ref 0–1)
BILIRUB SERPL-MCNC: 0.4 MG/DL (ref 0.2–1)
BUN SERPL-MCNC: 2 MG/DL (ref 6–20)
BUN/CREAT SERPL: 4 (ref 12–20)
CA-I BLD-MCNC: 8.3 MG/DL (ref 8.5–10.1)
CHLORIDE SERPL-SCNC: 105 MMOL/L (ref 97–108)
CO2 SERPL-SCNC: 25 MMOL/L (ref 21–32)
CREAT SERPL-MCNC: 0.49 MG/DL (ref 0.55–1.02)
DIFFERENTIAL METHOD BLD: ABNORMAL
EOSINOPHIL # BLD: 0 K/UL (ref 0–0.4)
EOSINOPHIL NFR BLD: 0 % (ref 0–7)
ERYTHROCYTE [DISTWIDTH] IN BLOOD BY AUTOMATED COUNT: 19.1 % (ref 11.5–14.5)
GLOBULIN SER CALC-MCNC: 4 G/DL (ref 2–4)
GLUCOSE SERPL-MCNC: 78 MG/DL (ref 65–100)
HCT VFR BLD AUTO: 22.7 % (ref 35–47)
HGB BLD-MCNC: 6.5 G/DL (ref 11.5–16)
IMM GRANULOCYTES # BLD AUTO: 0 K/UL
IMM GRANULOCYTES NFR BLD AUTO: 0 %
LYMPHOCYTES # BLD: 1.8 K/UL (ref 0.8–3.5)
LYMPHOCYTES NFR BLD: 21 % (ref 12–49)
MCH RBC QN AUTO: 23.4 PG (ref 26–34)
MCHC RBC AUTO-ENTMCNC: 28.6 G/DL (ref 30–36.5)
MCV RBC AUTO: 81.7 FL (ref 80–99)
MONOCYTES # BLD: 0.3 K/UL (ref 0–1)
MONOCYTES NFR BLD: 4 % (ref 5–13)
NEUTS BAND NFR BLD MANUAL: 2 % (ref 0–6)
NEUTS SEG # BLD: 6.2 K/UL (ref 1.8–8)
NEUTS SEG NFR BLD: 72 % (ref 32–75)
NRBC # BLD: 0.46 K/UL (ref 0–0.01)
NRBC BLD-RTO: 5.5 PER 100 WBC
PLATELET # BLD AUTO: 171 K/UL (ref 150–400)
PMV BLD AUTO: 9.1 FL (ref 8.9–12.9)
POTASSIUM SERPL-SCNC: 2.6 MMOL/L (ref 3.5–5.1)
PROT SERPL-MCNC: 6.4 G/DL (ref 6.4–8.2)
RBC # BLD AUTO: 2.78 M/UL (ref 3.8–5.2)
RBC MORPH BLD: ABNORMAL
SODIUM SERPL-SCNC: 137 MMOL/L (ref 136–145)
SPECIAL REQUESTS,SREQ: NORMAL
WBC # BLD AUTO: 8.4 K/UL (ref 3.6–11)

## 2022-04-20 PROCEDURE — 85025 COMPLETE CBC W/AUTO DIFF WBC: CPT

## 2022-04-20 PROCEDURE — 74011000258 HC RX REV CODE- 258: Performed by: OBSTETRICS & GYNECOLOGY

## 2022-04-20 PROCEDURE — 74011250636 HC RX REV CODE- 250/636: Performed by: OBSTETRICS & GYNECOLOGY

## 2022-04-20 PROCEDURE — 74011000250 HC RX REV CODE- 250: Performed by: OBSTETRICS & GYNECOLOGY

## 2022-04-20 PROCEDURE — 3E0S3BZ INTRODUCTION OF ANESTHETIC AGENT INTO EPIDURAL SPACE, PERCUTANEOUS APPROACH: ICD-10-PCS | Performed by: NURSE ANESTHETIST, CERTIFIED REGISTERED

## 2022-04-20 PROCEDURE — 76060000078 HC EPIDURAL ANESTHESIA

## 2022-04-20 PROCEDURE — 74011250637 HC RX REV CODE- 250/637: Performed by: OBSTETRICS & GYNECOLOGY

## 2022-04-20 PROCEDURE — 0HQ9XZZ REPAIR PERINEUM SKIN, EXTERNAL APPROACH: ICD-10-PCS | Performed by: OBSTETRICS & GYNECOLOGY

## 2022-04-20 PROCEDURE — 75410000002 HC LABOR FEE PER 1 HR

## 2022-04-20 PROCEDURE — 75410000003 HC RECOV DEL/VAG/CSECN EA 0.5 HR

## 2022-04-20 PROCEDURE — 36415 COLL VENOUS BLD VENIPUNCTURE: CPT

## 2022-04-20 PROCEDURE — 74011000250 HC RX REV CODE- 250: Performed by: NURSE ANESTHETIST, CERTIFIED REGISTERED

## 2022-04-20 PROCEDURE — 74011250637 HC RX REV CODE- 250/637: Performed by: INTERNAL MEDICINE

## 2022-04-20 PROCEDURE — 65410000002 HC RM PRIVATE OB

## 2022-04-20 PROCEDURE — 74011000250 HC RX REV CODE- 250

## 2022-04-20 PROCEDURE — 75410000000 HC DELIVERY VAGINAL/SINGLE

## 2022-04-20 PROCEDURE — 80053 COMPREHEN METABOLIC PANEL: CPT

## 2022-04-20 PROCEDURE — 59200 INSERT CERVICAL DILATOR: CPT

## 2022-04-20 RX ORDER — FENTANYL/BUPIVACAINE/NS/PF 2-1250MCG
PREFILLED PUMP RESERVOIR EPIDURAL
Status: COMPLETED
Start: 2022-04-20 | End: 2022-04-20

## 2022-04-20 RX ORDER — POTASSIUM CHLORIDE 1.5 G/1.77G
40 POWDER, FOR SOLUTION ORAL 2 TIMES DAILY WITH MEALS
Status: DISCONTINUED | OUTPATIENT
Start: 2022-04-20 | End: 2022-04-20

## 2022-04-20 RX ORDER — ZOLPIDEM TARTRATE 5 MG/1
5 TABLET ORAL
Status: DISCONTINUED | OUTPATIENT
Start: 2022-04-20 | End: 2022-04-25 | Stop reason: HOSPADM

## 2022-04-20 RX ORDER — OXYTOCIN/RINGER'S LACTATE 30/500 ML
10 PLASTIC BAG, INJECTION (ML) INTRAVENOUS AS NEEDED
Status: DISCONTINUED | OUTPATIENT
Start: 2022-04-20 | End: 2022-04-23

## 2022-04-20 RX ORDER — BUPIVACAINE HYDROCHLORIDE 2.5 MG/ML
INJECTION, SOLUTION EPIDURAL; INFILTRATION; INTRACAUDAL
Status: COMPLETED
Start: 2022-04-20 | End: 2022-04-20

## 2022-04-20 RX ORDER — DOCUSATE SODIUM 100 MG/1
100 CAPSULE, LIQUID FILLED ORAL
Status: DISCONTINUED | OUTPATIENT
Start: 2022-04-20 | End: 2022-04-20

## 2022-04-20 RX ORDER — LOPERAMIDE HYDROCHLORIDE 2 MG/1
2 CAPSULE ORAL
Status: DISCONTINUED | OUTPATIENT
Start: 2022-04-20 | End: 2022-04-25 | Stop reason: HOSPADM

## 2022-04-20 RX ORDER — IBUPROFEN 800 MG/1
800 TABLET ORAL EVERY 8 HOURS
Status: DISCONTINUED | OUTPATIENT
Start: 2022-04-20 | End: 2022-04-25 | Stop reason: HOSPADM

## 2022-04-20 RX ORDER — BUTORPHANOL TARTRATE 1 MG/ML
1 INJECTION INTRAMUSCULAR; INTRAVENOUS
Status: DISCONTINUED | OUTPATIENT
Start: 2022-04-20 | End: 2022-04-20

## 2022-04-20 RX ORDER — ONDANSETRON 4 MG/1
4 TABLET, ORALLY DISINTEGRATING ORAL
Status: DISCONTINUED | OUTPATIENT
Start: 2022-04-20 | End: 2022-04-20

## 2022-04-20 RX ORDER — POTASSIUM CHLORIDE 20 MEQ/1
40 TABLET, EXTENDED RELEASE ORAL 2 TIMES DAILY
Status: DISCONTINUED | OUTPATIENT
Start: 2022-04-20 | End: 2022-04-24

## 2022-04-20 RX ORDER — OXYTOCIN/RINGER'S LACTATE 30/500 ML
87.3 PLASTIC BAG, INJECTION (ML) INTRAVENOUS AS NEEDED
Status: DISCONTINUED | OUTPATIENT
Start: 2022-04-20 | End: 2022-04-23

## 2022-04-20 RX ORDER — IBUPROFEN 800 MG/1
800 TABLET ORAL EVERY 8 HOURS
Status: DISCONTINUED | OUTPATIENT
Start: 2022-04-20 | End: 2022-04-20

## 2022-04-20 RX ORDER — NALOXONE HYDROCHLORIDE 0.4 MG/ML
0.4 INJECTION, SOLUTION INTRAMUSCULAR; INTRAVENOUS; SUBCUTANEOUS AS NEEDED
Status: DISCONTINUED | OUTPATIENT
Start: 2022-04-20 | End: 2022-04-23

## 2022-04-20 RX ORDER — OXYTOCIN/RINGER'S LACTATE 30/500 ML
87.3 PLASTIC BAG, INJECTION (ML) INTRAVENOUS AS NEEDED
Status: DISCONTINUED | OUTPATIENT
Start: 2022-04-20 | End: 2022-04-20

## 2022-04-20 RX ORDER — HYDROCORTISONE ACETATE PRAMOXINE HCL 2.5; 1 G/100G; G/100G
CREAM TOPICAL AS NEEDED
Status: DISCONTINUED | OUTPATIENT
Start: 2022-04-20 | End: 2022-04-25 | Stop reason: HOSPADM

## 2022-04-20 RX ORDER — DIPHENHYDRAMINE HCL 25 MG
25 CAPSULE ORAL
Status: DISCONTINUED | OUTPATIENT
Start: 2022-04-20 | End: 2022-04-20

## 2022-04-20 RX ORDER — LIDOCAINE HYDROCHLORIDE AND EPINEPHRINE 15; 5 MG/ML; UG/ML
INJECTION, SOLUTION EPIDURAL
Status: SHIPPED | OUTPATIENT
Start: 2022-04-20 | End: 2022-04-20

## 2022-04-20 RX ORDER — DOCUSATE SODIUM 100 MG/1
100 CAPSULE, LIQUID FILLED ORAL
Status: DISCONTINUED | OUTPATIENT
Start: 2022-04-20 | End: 2022-04-25 | Stop reason: HOSPADM

## 2022-04-20 RX ORDER — IBUPROFEN 800 MG/1
800 TABLET ORAL
Status: DISCONTINUED | OUTPATIENT
Start: 2022-04-20 | End: 2022-04-20 | Stop reason: SDUPTHER

## 2022-04-20 RX ORDER — HYDROCORTISONE ACETATE PRAMOXINE HCL 2.5; 1 G/100G; G/100G
CREAM TOPICAL AS NEEDED
Status: DISCONTINUED | OUTPATIENT
Start: 2022-04-20 | End: 2022-04-20

## 2022-04-20 RX ORDER — OXYCODONE AND ACETAMINOPHEN 5; 325 MG/1; MG/1
2 TABLET ORAL
Status: DISCONTINUED | OUTPATIENT
Start: 2022-04-20 | End: 2022-04-24

## 2022-04-20 RX ORDER — OXYTOCIN/RINGER'S LACTATE 30/500 ML
10 PLASTIC BAG, INJECTION (ML) INTRAVENOUS AS NEEDED
Status: DISCONTINUED | OUTPATIENT
Start: 2022-04-20 | End: 2022-04-20

## 2022-04-20 RX ORDER — FENTANYL/BUPIVACAINE/NS/PF 2-1250MCG
10 PREFILLED PUMP RESERVOIR EPIDURAL
Status: DISCONTINUED | OUTPATIENT
Start: 2022-04-20 | End: 2022-04-20

## 2022-04-20 RX ORDER — POTASSIUM CHLORIDE 20 MEQ/1
40 TABLET, EXTENDED RELEASE ORAL 2 TIMES DAILY
Status: DISCONTINUED | OUTPATIENT
Start: 2022-04-20 | End: 2022-04-20

## 2022-04-20 RX ORDER — LABETALOL 200 MG/1
200 TABLET, FILM COATED ORAL ONCE
Status: COMPLETED | OUTPATIENT
Start: 2022-04-20 | End: 2022-04-20

## 2022-04-20 RX ORDER — OXYCODONE HYDROCHLORIDE 5 MG/1
5 TABLET ORAL
Status: DISCONTINUED | OUTPATIENT
Start: 2022-04-20 | End: 2022-04-20

## 2022-04-20 RX ORDER — NALOXONE HYDROCHLORIDE 0.4 MG/ML
0.4 INJECTION, SOLUTION INTRAMUSCULAR; INTRAVENOUS; SUBCUTANEOUS AS NEEDED
Status: DISCONTINUED | OUTPATIENT
Start: 2022-04-20 | End: 2022-04-20

## 2022-04-20 RX ORDER — NALBUPHINE HYDROCHLORIDE 10 MG/ML
2.5 INJECTION, SOLUTION INTRAMUSCULAR; INTRAVENOUS; SUBCUTANEOUS
Status: DISPENSED | OUTPATIENT
Start: 2022-04-20 | End: 2022-04-20

## 2022-04-20 RX ORDER — SIMETHICONE 80 MG
80 TABLET,CHEWABLE ORAL
Status: DISCONTINUED | OUTPATIENT
Start: 2022-04-20 | End: 2022-04-20

## 2022-04-20 RX ORDER — ZOLPIDEM TARTRATE 5 MG/1
5 TABLET ORAL
Status: DISCONTINUED | OUTPATIENT
Start: 2022-04-20 | End: 2022-04-20

## 2022-04-20 RX ORDER — BUPIVACAINE HYDROCHLORIDE 2.5 MG/ML
INJECTION, SOLUTION EPIDURAL; INFILTRATION; INTRACAUDAL
Status: DISCONTINUED | OUTPATIENT
Start: 2022-04-20 | End: 2022-04-20

## 2022-04-20 RX ORDER — ACETAMINOPHEN 325 MG/1
650 TABLET ORAL
Status: DISCONTINUED | OUTPATIENT
Start: 2022-04-20 | End: 2022-04-20

## 2022-04-20 RX ORDER — LABETALOL 200 MG/1
400 TABLET, FILM COATED ORAL EVERY 12 HOURS
Status: DISCONTINUED | OUTPATIENT
Start: 2022-04-20 | End: 2022-04-23

## 2022-04-20 RX ORDER — NORETHINDRONE AND ETHINYL ESTRADIOL 0.5-0.035
10 KIT ORAL
Status: DISCONTINUED | OUTPATIENT
Start: 2022-04-20 | End: 2022-04-20

## 2022-04-20 RX ORDER — NALOXONE HYDROCHLORIDE 0.4 MG/ML
0.04 INJECTION, SOLUTION INTRAMUSCULAR; INTRAVENOUS; SUBCUTANEOUS AS NEEDED
Status: DISCONTINUED | OUTPATIENT
Start: 2022-04-20 | End: 2022-04-20

## 2022-04-20 RX ORDER — BUPIVACAINE HYDROCHLORIDE 2.5 MG/ML
INJECTION, SOLUTION EPIDURAL; INFILTRATION; INTRACAUDAL AS NEEDED
Status: DISCONTINUED | OUTPATIENT
Start: 2022-04-20 | End: 2022-04-20 | Stop reason: HOSPADM

## 2022-04-20 RX ORDER — OXYTOCIN/RINGER'S LACTATE 30/500 ML
1-25 PLASTIC BAG, INJECTION (ML) INTRAVENOUS
Status: DISCONTINUED | OUTPATIENT
Start: 2022-04-20 | End: 2022-04-20

## 2022-04-20 RX ORDER — CALCIUM CARBONATE 200(500)MG
200 TABLET,CHEWABLE ORAL
Status: DISCONTINUED | OUTPATIENT
Start: 2022-04-20 | End: 2022-04-25 | Stop reason: HOSPADM

## 2022-04-20 RX ADMIN — LABETALOL HYDROCHLORIDE 400 MG: 200 TABLET, FILM COATED ORAL at 09:07

## 2022-04-20 RX ADMIN — LABETALOL HYDROCHLORIDE 400 MG: 200 TABLET, FILM COATED ORAL at 20:47

## 2022-04-20 RX ADMIN — LOPERAMIDE HYDROCHLORIDE 2 MG: 2 CAPSULE ORAL at 18:22

## 2022-04-20 RX ADMIN — BUPIVACAINE HYDROCHLORIDE 6 MG: 2.5 INJECTION, SOLUTION EPIDURAL; INFILTRATION; INTRACAUDAL; PERINEURAL at 10:22

## 2022-04-20 RX ADMIN — WITCH HAZEL 1 PAD: 500 SOLUTION RECTAL; TOPICAL at 17:07

## 2022-04-20 RX ADMIN — BUTORPHANOL TARTRATE 1 MG: 1 INJECTION, SOLUTION INTRAMUSCULAR; INTRAVENOUS at 06:15

## 2022-04-20 RX ADMIN — Medication 10 ML/HR: at 10:42

## 2022-04-20 RX ADMIN — OXYCODONE AND ACETAMINOPHEN 2 TABLET: 5; 325 TABLET ORAL at 17:04

## 2022-04-20 RX ADMIN — Medication 25 MCG: at 00:23

## 2022-04-20 RX ADMIN — SODIUM CHLORIDE 5 MILLION UNITS: 900 INJECTION INTRAVENOUS at 11:54

## 2022-04-20 RX ADMIN — Medication 2 MILLI-UNITS/MIN: at 09:07

## 2022-04-20 RX ADMIN — OXYCODONE AND ACETAMINOPHEN 2 TABLET: 5; 325 TABLET ORAL at 21:23

## 2022-04-20 RX ADMIN — CALCIUM CARBONATE 200 MG: 500 TABLET, CHEWABLE ORAL at 18:21

## 2022-04-20 RX ADMIN — ZOLPIDEM TARTRATE 5 MG: 5 TABLET ORAL at 21:20

## 2022-04-20 RX ADMIN — LIDOCAINE HYDROCHLORIDE AND EPINEPHRINE 4.5 ML: 15; 5 INJECTION, SOLUTION EPIDURAL at 10:00

## 2022-04-20 RX ADMIN — SODIUM CHLORIDE, PRESERVATIVE FREE 20 MG: 5 INJECTION INTRAVENOUS at 09:07

## 2022-04-20 RX ADMIN — LABETALOL HYDROCHLORIDE 200 MG: 200 TABLET, FILM COATED ORAL at 17:04

## 2022-04-20 RX ADMIN — LEVETIRACETAM 750 MG: 250 TABLET, FILM COATED ORAL at 09:07

## 2022-04-20 RX ADMIN — BUTORPHANOL TARTRATE 1 MG: 1 INJECTION, SOLUTION INTRAMUSCULAR; INTRAVENOUS at 02:16

## 2022-04-20 RX ADMIN — IBUPROFEN 800 MG: 800 TABLET, FILM COATED ORAL at 23:33

## 2022-04-20 RX ADMIN — AMPICILLIN SODIUM AND SULBACTAM SODIUM 3 G: 2; 1 INJECTION, POWDER, FOR SOLUTION INTRAMUSCULAR; INTRAVENOUS at 00:24

## 2022-04-20 RX ADMIN — POTASSIUM CHLORIDE 40 MEQ: 1.5 POWDER, FOR SOLUTION ORAL at 14:46

## 2022-04-20 RX ADMIN — POTASSIUM CHLORIDE 40 MEQ: 20 TABLET, EXTENDED RELEASE ORAL at 20:48

## 2022-04-20 RX ADMIN — POTASSIUM CHLORIDE 40 MEQ: 20 TABLET, EXTENDED RELEASE ORAL at 15:48

## 2022-04-20 RX ADMIN — IBUPROFEN 800 MG: 800 TABLET, FILM COATED ORAL at 15:48

## 2022-04-20 RX ADMIN — LEVETIRACETAM 750 MG: 250 TABLET, FILM COATED ORAL at 21:20

## 2022-04-20 NOTE — ANESTHESIA PROCEDURE NOTES
Epidural Block    Patient location during procedure: OB  Start time: 4/20/2022 10:00 AM  End time: 4/20/2022 10:17 AM  Reason for block: labor epidural  Staffing  Performed: CRNA   Anesthesiologist: Jess Alejandro MD  Resident/CRNA: Lorie Chris CRNA  Preanesthetic Checklist  Completed: patient identified, IV checked, risks and benefits discussed, surgical consent, monitors and equipment checked, pre-op evaluation and timeout performed  Block Placement  Patient position: right lateral decubitus  Prep: ChloraPrep  Sterility prep: cap, drape, gloves, mask and hand  Sedation level: no sedation  Patient monitoring: frequent blood pressure checks, continuous pulse oximetry and heart rate  Approach: midline  Location: lumbar  Epidural  Loss of resistance technique: air  Guidance: landmark technique  Needle  Needle type: Tuohy   Needle gauge: 17 G  Needle length: 10 cm  Catheter type: end hole  Catheter size: 19 G  Catheter at skin depth: 12 cm  Catheter securement method: clear occlusive dressing and surgical tape  Test dose: negative  Medications Administered  Lidocaine-EPINEPHrine (XYLOCAINE) 1.5 %-1:200,000 Epidural, 4.5 mL  Assessment  Sensory level: T10  Block outcome: pain improved  Number of attempts: 1  Procedure assessment: patient tolerated procedure well with no immediate complications

## 2022-04-20 NOTE — OP NOTES
Delivery Procedure Note    Delivery Physician:  Kiesha Ruiz MD    Procedure: Spontaneous vaginal delivery    Indications for instrumental delivery: none    Estimated Blood Loss: 250 cc    Episiotomy: No    Laceration(s):  1st degree perineal    Laceration(s) repair: YES    Presentation: Cephalic    Fetal Position: Occiput Posterior    Umbilical Cord: 3 vessels present and Cord blood sent to lab for type, Rh, and Cata' test    Placenta Removal: spontaneous    Placenta Appearance: normal    Specimens: Cord Blood           Complications:  Oligohydramnios, Pre-eclampsia               Signed By:  Kiesha Ruiz MD     April 20, 2022

## 2022-04-20 NOTE — PROGRESS NOTES
TRANSFER - IN REPORT:    At 1555 verbal report received from [facility] on [employer]  being received from L&D  for routine progression of care      Report consisted of patients Situation, Background, Assessment and   Recommendations(SBAR). Information from the following report(s) SBAR was reviewed with the receiving nurse. Opportunity for questions and clarification was provided. Assessment completed upon patients arrival to unit and care assumed. Patient was oriented to room and surroundings. Postpartum informational packet, birth certificate, masking and visitor policy sheets given to patient. MD Vanessa Helms also present and NICU visitation policy reviewed with patient. Patient states she understands she is able to go visit her baby as she pleases, has no other questions or concerns. Call bell within reach and bed in lowest position. At [facility] notified MD Desmond Oliva of blood pressure and LLE 2 beats clonus. Order received for an additional 200 mg PO labetalol to be given now and continue withi scheduled times, as well.

## 2022-04-20 NOTE — PROGRESS NOTES
Progress Note    Patient: Edvin King MRN: [id number]  SSN: [id number]    YOB: 1985  Age: 39 y.o. Sex: female      Admit Date: 4/14/2022    LOS: 5 days     Subjective:     No Complaints    Objective:     Vitals:    04/20/22 1144 04/20/22 1145 04/20/22 1147 04/20/22 1238   BP:  125/79     Pulse:  86     Resp:       Temp:    97.3 °F (36.3 °C)   SpO2: 98%  100%    Weight:       Height:            Intake and Output:  Current Shift: 04/20 0701 - 04/20 1900  In: -   Out: 401 [Urine:401]  Last three shifts: No intake/output data recorded. Physical Exam:   Abd: Gravid, NST reactive  Cx: 3cm/Vtx, AROM--> Clear    Lab/Data Review: All lab results for the last 24 hours reviewed.          Assessment:     Active Problems:    Seizure disorder ([address] (11/30/2016)      Dental abscess (4/15/2022)      Pregnancy (4/15/2022)      No prenatal care in current pregnancy in third trimester (4/16/2022)      Anemia during pregnancy in third trimester (4/16/2022)      Positive urine drug screen for barbiturates (4/16/2022)      Pregnancy with uncertain dates in third trimester (4/16/2022)      Multigravida of advanced maternal age in third trimester (4/16/2022)      IZZY (amniotic fluid index) borderline low (4/16/2022)      Proteinuria during pregnancy (4/16/2022)        Plan:     Routine orders: ASVD    Signed By: Lluvia Sanabria MD     April 20, 2022

## 2022-04-20 NOTE — PROGRESS NOTES
0700- Bedside report received from PIPER Tolbert RN. Patient up to bathroom. 0900- Dr Desmond Oliva at bedside discussing [address] with patient. Pitocin started. Patient educated on pitocin. 9965- Patient requesting epidural. Anesthesia called, spoke with Dr Nenita Yang. 6757- Dr Gonzalez Situ at bedside to give epidural     1000- epidural time out completed     0312 [id number]- Patient hard to trace, having variables and decelerations. MD notified. SVE 9.5/100/+2    1225- Dr Desmond Oliva at bedside, patient started pushing     1228- baby born     18- placenta     80- 3 chromic gut 1st degree     1245- recovery period started     12- recovery period ended. Patient assisted to bathroom, voided and washed up. 32 61 16- Report given on postpartum to Shereen Quiñones RN and Bry Fleming RN to assume care of patient.

## 2022-04-20 NOTE — CONSULTS
Consult Date: 4/20/2022    Consults Ms. Concepcion Mckeon is a 39year old woman 33 weeks pregnant with severe pre-eclampsia now BP well controlled who has new onset headaches since last night. She says when she bends forward or coughs she will have a pressure like frontal headache. This resolves if she sits up. She denies any visual disturbances. She does have some nausea and vomiting. Tylenol, percocet has not helped.      Subjective  headaches improved this am. MRI brain and MRV normal.     Past Medical History:   Diagnosis Date    Anemia     Chlamydia     Depression     Epilepsy ([address]     Essential hypertension     Hernia     Hypokalemia 4/14/2022    Psychiatric problem     Seizure [facility])       Past Surgical History:   Procedure Laterality Date    HX ENDOSCOPY       Family History   Problem Relation Age of Onset    Other Mother         growth/tumor on brain   [facility] Migraines Mother     Hypertension Mother       Social History     Tobacco Use    Smoking status: Current Every Day Smoker     Packs/day: 0.25    Smokeless tobacco: Never Used   Substance Use Topics    Alcohol use: Not Currently       Current Facility-Administered Medications   Medication Dose Route Frequency Provider Last Rate Last Admin    butorphanol (STADOL) injection 1 mg  1 mg IntraVENous Q3H PRN Rex Maldonado MD   1 mg at 04/20/22 0615    iron sucrose (VENOFER) 300 mg in 0.9% sodium chloride 250 mL IVPB  300 mg IntraVENous ONCE Spring Hazel MD        oxytocin (PITOCIN) 30 units/500 ml LR  1-25 shahid-units/min IntraVENous TITRATE Lizbet Mcnair MD 2 mL/hr at 04/20/22 0907 2 shahid-units/min at 04/20/22 6483    miSOPROStol (CYTOTEC) tablet (prepacked) 25 mcg  25 mcg Oral Q6H PRN Rex Maldonado MD   25 mcg at 04/20/22 0023    guaiFENesin (ROBITUSSIN) 100 mg/5 mL oral liquid 200 mg  200 mg Oral Q6H PRN Vivek Cano MD   200 mg at 04/18/22 0605    butalbital-acetaminophen-caffeine (FIORICET, ESGIC) -40 mg per tablet 2 Tablet  2 Tablet Oral Q6H PRN Alesia Wolfe MD   2 Tablet at 04/19/22 0737    calcium carbonate (TUMS) chewable tablet 400 mg [elemental]  400 mg Oral Q6H PRN Jesus Alberto Darnell MD   400 mg at 04/17/22 1726    famotidine (PF) (PEPCID) 20 mg in 0.9% sodium chloride 10 mL injection  20 mg IntraVENous BID Jesus Alberto Darnell MD   20 mg at 04/20/22 4105    benzocaine-menthol-zinc chloride (ORAJEL) 20-0.1-0.15 % mucosal gel   Mucous Membrane PRN Kati Vela MD   Given at 04/16/22 2042    levETIRAcetam (KEPPRA) tablet 750 mg  750 mg Oral BID Gayathri Ponce MD   750 mg at 04/20/22 5289    sodium chloride (NS) flush 5-40 mL  5-40 mL IntraVENous Q8H Leobardo Lopez MD   10 mL at 04/19/22 1400    sodium chloride (NS) flush 5-40 mL  5-40 mL IntraVENous PRN Gayathri Ponce MD   10 mL at 04/19/22 0492    acetaminophen (TYLENOL) tablet 650 mg  650 mg Oral Q6H PRN Gayathri Ponce MD   650 mg at 04/17/22 2105    Or    acetaminophen (TYLENOL) suppository 650 mg  650 mg Rectal Q6H PRN Leobardo Lopez MD        polyethylene glycol (MIRALAX) packet 17 g  17 g Oral DAILY PRN Leobardo Lopez MD        ondansetron (ZOFRAN ODT) tablet 4 mg  4 mg Oral Q8H PRN Gayathri Ponce MD   4 mg at 04/17/22 2149    Or    ondansetron (ZOFRAN) injection 4 mg  4 mg IntraVENous Q6H PRN Gayathri Ponce MD   4 mg at 04/18/22 0912    labetaloL (NORMODYNE;TRANDATE) injection 10 mg  10 mg IntraVENous Q1H PRN Gayathri Ponce MD        labetaloL (NORMODYNE) tablet 400 mg  400 mg Oral Q12H Mildred Dockery MD   400 mg at 04/20/22 2009    oxyCODONE-acetaminophen (PERCOCET) 5-325 mg per tablet 1 Tablet  1 Tablet Oral Q4H PRN Kati Vela MD   1 Tablet at 04/19/22 1846    zolpidem (AMBIEN) tablet 5 mg  5 mg Oral QHS PRN Kati Vela MD   5 mg at 04/19/22 2156        Review of Systems   Neurological: Positive for headaches. All other systems reviewed and are negative.       Objective     Vital signs for last 24 hours:  Visit Vitals  BP (!) 139/95   Pulse 93   Temp 98.2 °F (36.8 °C)   Resp 18   Ht 5' 2\" (1.575 m)   Wt 83.9 kg (185 lb)   SpO2 96%   Breastfeeding No   BMI 33.84 kg/m²       Intake/Output this shift:  Current Shift: 04/20 0701 - 04/20 1900  In: -   Out: 1 [Urine:1]  Last 3 Shifts: No intake/output data recorded. Data Review:   No results found for this or any previous visit (from the past 24 hour(s)). Physical Exam    Neuro Physical Exam      General: Well developed, well nourished. Patient in no apparent distress. Cardiac: Regular rate and rhythm       Neurological Exam:  Mental Status: Awake, alert, oriented, normal speech   Cranial Nerves:   Intact visual fields. PERRL, EOM's full, no nystagmus, no ptosis. Facial sensation is normal. Facial movement is symmetric. Palate is midline. Normal sternocleidomastoid strength. Tongue is midline. Hearing is intact bilaterally. Motor:  5/5 strength in upper and lower proximal and distal muscles. Normal bulk and tone. Reflexes:   Deep tendon reflexes 3+/4 and symmetrical.   Sensory:   Normal to light touch   Gait:  Normal gait. Tremor:   No tremor noted. Cerebellar:  No cerebellar signs present. Babinski:      Down b/l     Assessment and Plan: Ms. Yohana Lucas is a 39year old woman 33 weeks pregnant, pre-eclampsia who comes in with new onset headaches since last night. MRI brain and MRV normal. Plan for induction today due to pre eclampsia. Headaches improved today. Will sign off.  Thank you

## 2022-04-20 NOTE — PROGRESS NOTES
8182- lab made aware of need for stat lab draw     0905- Dr Sy Kimble made aware of need for epidural placement for patient.  States he will be right up

## 2022-04-20 NOTE — ANESTHESIA PREPROCEDURE EVALUATION
Relevant Problems   No relevant active problems       Anesthetic History   No history of anesthetic complications            Review of Systems / Medical History  Patient summary reviewed and pertinent labs reviewed    Pulmonary                   Neuro/Psych     seizures    Psychiatric history     Cardiovascular    Hypertension              Exercise tolerance: >4 METS     GI/Hepatic/Renal           Hiatal hernia     Endo/Other        Anemia     Other Findings              Physical Exam    Airway  Mallampati: II  TM Distance: 4 - 6 cm  Neck ROM: normal range of motion   Mouth opening: Normal     Cardiovascular    Rhythm: regular  Rate: normal         Dental  No notable dental hx       Pulmonary                 Abdominal  Abdominal exam normal       Other Findings   Comments: 85/43/48 2937  METABOLIC PANEL, COMPREHENSIVE   Collected: 04/20/22 0730  Final result  Specimen: Serum or Plasma     Sodium 137 mmol/L GFR est non-AA >60 ml/min/1.73m2  Potassium 2.6 Low Panic  mmol/L  Calcium 8.3 Low  mg/dL  Chloride 105 mmol/L Bilirubin, total 0.4 mg/dL  CO2 25 mmol/L AST (SGOT) 25 U/L  Anion gap 7 mmol/L ALT (SGPT) 17 U/L  Glucose 78 mg/dL Alk. phosphatase 139 High  U/L  BUN 2 Low  mg/dL Protein, total 6.4 g/dL  Creatinine 0.49 Low  mg/dL Albumin 2.4 Low  g/dL  BUN/Creatinine ratio 4 Low    Globulin 4.0 g/dL  GFR est AA >60 ml/min/1.73m2 A-G Ratio 0.6 Low          04/20/22 1019  CBC WITH AUTOMATED DIFF   Collected: 04/20/22 0730  Preliminary result  Specimen: Whole Blood     WBC 8.4 K/uL LYMPHOCYTES PENDING %  RBC 2.78 Low  M/uL MONOCYTES PENDING %  HGB 6.5 Low  g/dL EOSINOPHILS PENDING %  HCT 22.7 Low  % BASOPHILS PENDING %  MCV 81.7 FL IMMATURE GRANULOCYTES PENDING %  MCH 23.4 Low  PG ABS. NEUTROPHILS PENDING K/UL  MCHC 28.6 Low  g/dL ABS. LYMPHOCYTES PENDING K/UL  RDW 19.1 High  % ABS. MONOCYTES PENDING K/UL  PLATELET 267 K/uL ABS. EOSINOPHILS PENDING K/UL  MPV 9.1 FL ABS.  BASOPHILS PENDING K/UL  NRBC 5.5 High   WBC ABS. IMM. GRANS.  PENDING K/UL  ABSOLUTE NRBC 0.46 High  K/uL DF PENDING  NEUTROPHILS PENDING %                  Anesthetic Plan    ASA: 2  Anesthesia type: epidural            Anesthetic plan and risks discussed with: Patient

## 2022-04-21 LAB
ALBUMIN SERPL-MCNC: 2.4 G/DL (ref 3.5–5)
ALBUMIN/GLOB SERPL: 0.7 {RATIO} (ref 1.1–2.2)
ALP SERPL-CCNC: 140 U/L (ref 45–117)
ALT SERPL-CCNC: 19 U/L (ref 12–78)
ANION GAP SERPL CALC-SCNC: 8 MMOL/L (ref 5–15)
AST SERPL W P-5'-P-CCNC: 28 U/L (ref 15–37)
BACTERIA SPEC CULT: NORMAL
BASOPHILS # BLD: 0 K/UL (ref 0–0.1)
BASOPHILS NFR BLD: 0 % (ref 0–1)
BILIRUB SERPL-MCNC: 0.3 MG/DL (ref 0.2–1)
BUN SERPL-MCNC: 3 MG/DL (ref 6–20)
BUN/CREAT SERPL: 6 (ref 12–20)
CA-I BLD-MCNC: 8.2 MG/DL (ref 8.5–10.1)
CHLORIDE SERPL-SCNC: 108 MMOL/L (ref 97–108)
CO2 SERPL-SCNC: 24 MMOL/L (ref 21–32)
CREAT SERPL-MCNC: 0.52 MG/DL (ref 0.55–1.02)
DIFFERENTIAL METHOD BLD: ABNORMAL
EOSINOPHIL # BLD: 0.1 K/UL (ref 0–0.4)
EOSINOPHIL NFR BLD: 1 % (ref 0–7)
ERYTHROCYTE [DISTWIDTH] IN BLOOD BY AUTOMATED COUNT: 20.5 % (ref 11.5–14.5)
GLOBULIN SER CALC-MCNC: 3.4 G/DL (ref 2–4)
GLUCOSE SERPL-MCNC: 72 MG/DL (ref 65–100)
HCT VFR BLD AUTO: 23.1 % (ref 35–47)
HGB BLD-MCNC: 6.7 G/DL (ref 11.5–16)
IMM GRANULOCYTES # BLD AUTO: 0.1 K/UL (ref 0–0.04)
IMM GRANULOCYTES NFR BLD AUTO: 1 % (ref 0–0.5)
LYMPHOCYTES # BLD: 2 K/UL (ref 0.8–3.5)
LYMPHOCYTES NFR BLD: 22 % (ref 12–49)
MCH RBC QN AUTO: 24.1 PG (ref 26–34)
MCHC RBC AUTO-ENTMCNC: 29 G/DL (ref 30–36.5)
MCV RBC AUTO: 83.1 FL (ref 80–99)
MONOCYTES # BLD: 0.7 K/UL (ref 0–1)
MONOCYTES NFR BLD: 7 % (ref 5–13)
NEUTS SEG # BLD: 6.1 K/UL (ref 1.8–8)
NEUTS SEG NFR BLD: 69 % (ref 32–75)
NRBC # BLD: 0.33 K/UL (ref 0–0.01)
NRBC BLD-RTO: 3.7 PER 100 WBC
PLATELET # BLD AUTO: 166 K/UL (ref 150–400)
PMV BLD AUTO: 9.5 FL (ref 8.9–12.9)
POTASSIUM SERPL-SCNC: 3.5 MMOL/L (ref 3.5–5.1)
PROT SERPL-MCNC: 5.8 G/DL (ref 6.4–8.2)
RBC # BLD AUTO: 2.78 M/UL (ref 3.8–5.2)
SODIUM SERPL-SCNC: 140 MMOL/L (ref 136–145)
SPECIAL REQUESTS,SREQ: NORMAL
WBC # BLD AUTO: 9 K/UL (ref 3.6–11)

## 2022-04-21 PROCEDURE — 74011250636 HC RX REV CODE- 250/636

## 2022-04-21 PROCEDURE — 99024 POSTOP FOLLOW-UP VISIT: CPT | Performed by: OBSTETRICS & GYNECOLOGY

## 2022-04-21 PROCEDURE — 65410000002 HC RM PRIVATE OB

## 2022-04-21 PROCEDURE — 74011250636 HC RX REV CODE- 250/636: Performed by: OBSTETRICS & GYNECOLOGY

## 2022-04-21 PROCEDURE — 74011250637 HC RX REV CODE- 250/637: Performed by: OBSTETRICS & GYNECOLOGY

## 2022-04-21 PROCEDURE — 85025 COMPLETE CBC W/AUTO DIFF WBC: CPT

## 2022-04-21 PROCEDURE — 80053 COMPREHEN METABOLIC PANEL: CPT

## 2022-04-21 PROCEDURE — 36415 COLL VENOUS BLD VENIPUNCTURE: CPT

## 2022-04-21 RX ORDER — MAGNESIUM SULFATE HEPTAHYDRATE 40 MG/ML
4 INJECTION, SOLUTION INTRAVENOUS ONCE
Status: COMPLETED | OUTPATIENT
Start: 2022-04-21 | End: 2022-04-21

## 2022-04-21 RX ORDER — SODIUM CHLORIDE, SODIUM LACTATE, POTASSIUM CHLORIDE, CALCIUM CHLORIDE 600; 310; 30; 20 MG/100ML; MG/100ML; MG/100ML; MG/100ML
75 INJECTION, SOLUTION INTRAVENOUS CONTINUOUS
Status: DISCONTINUED | OUTPATIENT
Start: 2022-04-21 | End: 2022-04-23

## 2022-04-21 RX ORDER — ONDANSETRON 2 MG/ML
4 INJECTION INTRAMUSCULAR; INTRAVENOUS
Status: DISCONTINUED | OUTPATIENT
Start: 2022-04-21 | End: 2022-04-25 | Stop reason: HOSPADM

## 2022-04-21 RX ORDER — MAGNESIUM SULFATE HEPTAHYDRATE 40 MG/ML
2 INJECTION, SOLUTION INTRAVENOUS CONTINUOUS
Status: DISCONTINUED | OUTPATIENT
Start: 2022-04-22 | End: 2022-04-21

## 2022-04-21 RX ORDER — MAGNESIUM SULFATE HEPTAHYDRATE 40 MG/ML
2 INJECTION, SOLUTION INTRAVENOUS CONTINUOUS
Status: DISCONTINUED | OUTPATIENT
Start: 2022-04-22 | End: 2022-04-23

## 2022-04-21 RX ORDER — LABETALOL HCL 20 MG/4 ML
SYRINGE (ML) INTRAVENOUS
Status: COMPLETED
Start: 2022-04-21 | End: 2022-04-21

## 2022-04-21 RX ORDER — CALCIUM GLUCONATE 20 MG/ML
1 INJECTION, SOLUTION INTRAVENOUS AS NEEDED
Status: DISCONTINUED | OUTPATIENT
Start: 2022-04-21 | End: 2022-04-23

## 2022-04-21 RX ORDER — ACETAMINOPHEN 325 MG/1
650 TABLET ORAL
Status: DISCONTINUED | OUTPATIENT
Start: 2022-04-21 | End: 2022-04-25 | Stop reason: HOSPADM

## 2022-04-21 RX ORDER — ACETAMINOPHEN 650 MG/1
650 SUPPOSITORY RECTAL
Status: DISCONTINUED | OUTPATIENT
Start: 2022-04-21 | End: 2022-04-25 | Stop reason: HOSPADM

## 2022-04-21 RX ORDER — SODIUM CHLORIDE 0.9 % (FLUSH) 0.9 %
5-40 SYRINGE (ML) INJECTION AS NEEDED
Status: DISCONTINUED | OUTPATIENT
Start: 2022-04-21 | End: 2022-04-25 | Stop reason: HOSPADM

## 2022-04-21 RX ORDER — LABETALOL HYDROCHLORIDE 5 MG/ML
10 INJECTION, SOLUTION INTRAVENOUS
Status: COMPLETED | OUTPATIENT
Start: 2022-04-21 | End: 2022-04-21

## 2022-04-21 RX ADMIN — MAGNESIUM SULFATE IN WATER 4 G: 40 INJECTION, SOLUTION INTRAVENOUS at 23:28

## 2022-04-21 RX ADMIN — OXYCODONE AND ACETAMINOPHEN 2 TABLET: 5; 325 TABLET ORAL at 05:22

## 2022-04-21 RX ADMIN — IBUPROFEN 800 MG: 800 TABLET, FILM COATED ORAL at 15:37

## 2022-04-21 RX ADMIN — IBUPROFEN 800 MG: 800 TABLET, FILM COATED ORAL at 08:36

## 2022-04-21 RX ADMIN — LABETALOL HYDROCHLORIDE 400 MG: 200 TABLET, FILM COATED ORAL at 21:14

## 2022-04-21 RX ADMIN — LOPERAMIDE HYDROCHLORIDE 2 MG: 2 CAPSULE ORAL at 08:39

## 2022-04-21 RX ADMIN — OXYCODONE AND ACETAMINOPHEN 2 TABLET: 5; 325 TABLET ORAL at 21:14

## 2022-04-21 RX ADMIN — OXYCODONE AND ACETAMINOPHEN 2 TABLET: 5; 325 TABLET ORAL at 15:37

## 2022-04-21 RX ADMIN — LOPERAMIDE HYDROCHLORIDE 2 MG: 2 CAPSULE ORAL at 21:25

## 2022-04-21 RX ADMIN — OXYCODONE AND ACETAMINOPHEN 2 TABLET: 5; 325 TABLET ORAL at 09:26

## 2022-04-21 RX ADMIN — POTASSIUM CHLORIDE 40 MEQ: 20 TABLET, EXTENDED RELEASE ORAL at 21:14

## 2022-04-21 RX ADMIN — SODIUM CHLORIDE, POTASSIUM CHLORIDE, SODIUM LACTATE AND CALCIUM CHLORIDE 75 ML/HR: 600; 310; 30; 20 INJECTION, SOLUTION INTRAVENOUS at 23:28

## 2022-04-21 RX ADMIN — POTASSIUM CHLORIDE 40 MEQ: 20 TABLET, EXTENDED RELEASE ORAL at 08:35

## 2022-04-21 RX ADMIN — MAGNESIUM SULFATE IN WATER 2 G/HR: 40 INJECTION, SOLUTION INTRAVENOUS at 23:28

## 2022-04-21 RX ADMIN — LABETALOL HYDROCHLORIDE 10 MG: 5 INJECTION, SOLUTION INTRAVENOUS at 23:43

## 2022-04-21 RX ADMIN — LABETALOL HYDROCHLORIDE 400 MG: 200 TABLET, FILM COATED ORAL at 08:35

## 2022-04-21 RX ADMIN — LEVETIRACETAM 750 MG: 250 TABLET, FILM COATED ORAL at 08:36

## 2022-04-21 RX ADMIN — LEVETIRACETAM 750 MG: 250 TABLET, FILM COATED ORAL at 21:52

## 2022-04-21 NOTE — PROGRESS NOTES
1940: Electric breast pump provided and patient educated how to assemble with kit. Patient shown how to operate and instructed to pump at least every 3 hours for 20 minutes each time. Pt instructed to save breast milk that has been pumped so that it can be taken to the NICU. Pt voiced understanding and denies having any questions at this time.

## 2022-04-21 NOTE — PROGRESS NOTES
Progress Note                               Patient: Geraldine Dubin MRN: [id number]  SSN: [id number]    YOB: 1985  Age: 39 y.o. Sex: female      Postpartum Day Number 1    Subjective:     Patient doing well postpartum without significant complaints. Voiding without difficulty. Patient reports normal lochia. .     Objective:     Patient Vitals for the past 18 hrs:   Temp Pulse Resp BP SpO2   22 1534 97.9 °F (36.6 °C) 83 18 135/88 100 %   22 0825 98.2 °F (36.8 °C) 89 18 (!) 140/104 100 %   22 2334 98.2 °F (36.8 °C) 86 16 (!) 135/92 100 %        Temp (24hrs), Av.1 °F (36.7 °C), Min:97.9 °F (36.6 °C), Max:98.3 °F (36.8 °C)      Physical Exam:    General:   Patient without distress. Abdomen: Soft, fundus firm at level of umbilicus, nontender   Lower Extremities: Negative for swelling, cords, or tenderness. Lab/Data Review: All lab results for the last 24 hours reviewed. Assessment and Plan:     Patient appears to be having an uncomplicated postpartum course. Continue routine perineal care and maternal education.

## 2022-04-21 NOTE — PROGRESS NOTES
0925-Patient reports pain in lower back at epidural site 8/10, percocet given po. Pt reports no other needs or concerns at this time. 1025-Patient reports no needs or concerns at this time. 1130-Patient reports no needs or concerns at this time. 1230-Patient reports no needs or concerns at this time  1330-Patient reports no needs or concerns at this time. 1430-Patient reports no needs or concerns at this time. 1530-Vitals obtained, scheduled motrin given and percocet also given per pt request for back pain. 1635-Patient lying in bed talking on phone, reports no needs or concerns at this time. 1740-Patient reports no needs or concerns at this time, lying in bed, watching tv.

## 2022-04-21 NOTE — PROGRESS NOTES
Patient ambulated back to room 325 from NICU; Steady gait noted. Rounded with shift report. Report given to COLLEEN Brown RN.

## 2022-04-22 PROBLEM — Z34.93 PREGNANCY WITH UNCERTAIN DATES IN THIRD TRIMESTER: Status: RESOLVED | Noted: 2022-04-16 | Resolved: 2022-04-22

## 2022-04-22 PROBLEM — O28.8 AFI (AMNIOTIC FLUID INDEX) BORDERLINE LOW: Status: RESOLVED | Noted: 2022-04-16 | Resolved: 2022-04-22

## 2022-04-22 PROBLEM — O09.523 MULTIGRAVIDA OF ADVANCED MATERNAL AGE IN THIRD TRIMESTER: Status: RESOLVED | Noted: 2022-04-16 | Resolved: 2022-04-22

## 2022-04-22 PROBLEM — O12.10 PROTEINURIA DURING PREGNANCY: Status: RESOLVED | Noted: 2022-04-16 | Resolved: 2022-04-22

## 2022-04-22 PROBLEM — Z34.90 PREGNANCY: Status: RESOLVED | Noted: 2022-04-15 | Resolved: 2022-04-22

## 2022-04-22 PROBLEM — O09.33 NO PRENATAL CARE IN CURRENT PREGNANCY IN THIRD TRIMESTER: Status: RESOLVED | Noted: 2022-04-16 | Resolved: 2022-04-22

## 2022-04-22 PROBLEM — E87.6 HYPOKALEMIA: Status: RESOLVED | Noted: 2022-04-14 | Resolved: 2022-04-22

## 2022-04-22 LAB
BACTERIA SPEC CULT: NORMAL
MAGNESIUM SERPL-MCNC: 6.5 MG/DL (ref 1.6–2.4)
MAGNESIUM SERPL-MCNC: 7.2 MG/DL (ref 1.6–2.4)
SPECIAL REQUESTS,SREQ: NORMAL

## 2022-04-22 PROCEDURE — 65410000002 HC RM PRIVATE OB

## 2022-04-22 PROCEDURE — 74011250637 HC RX REV CODE- 250/637: Performed by: OBSTETRICS & GYNECOLOGY

## 2022-04-22 PROCEDURE — 74011250636 HC RX REV CODE- 250/636: Performed by: OBSTETRICS & GYNECOLOGY

## 2022-04-22 PROCEDURE — 83735 ASSAY OF MAGNESIUM: CPT

## 2022-04-22 PROCEDURE — 36415 COLL VENOUS BLD VENIPUNCTURE: CPT

## 2022-04-22 RX ORDER — LANOLIN ALCOHOL/MO/W.PET/CERES
1 CREAM (GRAM) TOPICAL
Status: DISCONTINUED | OUTPATIENT
Start: 2022-04-22 | End: 2022-04-25 | Stop reason: HOSPADM

## 2022-04-22 RX ADMIN — LABETALOL HYDROCHLORIDE 400 MG: 200 TABLET, FILM COATED ORAL at 08:47

## 2022-04-22 RX ADMIN — ACETAMINOPHEN 650 MG: 325 TABLET ORAL at 20:41

## 2022-04-22 RX ADMIN — MAGNESIUM SULFATE IN WATER 2 G/HR: 40 INJECTION, SOLUTION INTRAVENOUS at 17:07

## 2022-04-22 RX ADMIN — LEVETIRACETAM 750 MG: 250 TABLET, FILM COATED ORAL at 20:56

## 2022-04-22 RX ADMIN — FERROUS SULFATE TAB 325 MG (65 MG ELEMENTAL FE) 325 MG: 325 (65 FE) TAB at 08:48

## 2022-04-22 RX ADMIN — IBUPROFEN 800 MG: 800 TABLET, FILM COATED ORAL at 17:07

## 2022-04-22 RX ADMIN — LABETALOL HYDROCHLORIDE 400 MG: 200 TABLET, FILM COATED ORAL at 20:39

## 2022-04-22 RX ADMIN — FERROUS SULFATE TAB 325 MG (65 MG ELEMENTAL FE) 325 MG: 325 (65 FE) TAB at 17:07

## 2022-04-22 RX ADMIN — OXYCODONE AND ACETAMINOPHEN 2 TABLET: 5; 325 TABLET ORAL at 08:20

## 2022-04-22 RX ADMIN — OXYCODONE AND ACETAMINOPHEN 2 TABLET: 5; 325 TABLET ORAL at 18:15

## 2022-04-22 RX ADMIN — LEVETIRACETAM 750 MG: 250 TABLET, FILM COATED ORAL at 08:48

## 2022-04-22 RX ADMIN — CALCIUM CARBONATE 200 MG: 500 TABLET, CHEWABLE ORAL at 12:16

## 2022-04-22 RX ADMIN — IBUPROFEN 800 MG: 800 TABLET, FILM COATED ORAL at 00:37

## 2022-04-22 RX ADMIN — OXYCODONE AND ACETAMINOPHEN 2 TABLET: 5; 325 TABLET ORAL at 12:14

## 2022-04-22 RX ADMIN — POTASSIUM CHLORIDE 40 MEQ: 20 TABLET, EXTENDED RELEASE ORAL at 08:48

## 2022-04-22 RX ADMIN — POTASSIUM CHLORIDE 40 MEQ: 20 TABLET, EXTENDED RELEASE ORAL at 20:39

## 2022-04-22 RX ADMIN — FERROUS SULFATE TAB 325 MG (65 MG ELEMENTAL FE) 325 MG: 325 (65 FE) TAB at 12:14

## 2022-04-22 RX ADMIN — ZOLPIDEM TARTRATE 5 MG: 5 TABLET ORAL at 01:09

## 2022-04-22 RX ADMIN — IBUPROFEN 800 MG: 800 TABLET, FILM COATED ORAL at 08:48

## 2022-04-22 NOTE — PROGRESS NOTES
2125: Pt transferred to LD 4 from  due to elevated Bps. Report received from NINFA Mota. Pt alert and comfortable in bed.     2249: Dr. Jazmyne Silverman updated about pt, Order received to start Magnesium sulfate infusion. 2253:Pt updated re plan of care. 2328: Magnesium sulfate infusion commence with 4 gms bolus as per order to be followed by continuous infusion of 2 gm /hr.     2330:Dr. Jazmyne Silverman at bedside talking with pt. Plan of care discussed. Pt encouraged to limit time on phone and try to rest.    2335: Bps reviewed with dr. Jazmyne Silverman order received for IV labetalol 10 mg stat. 2347: IV infiltrated, access removed. 0016: New IV sited, pt was difficult stick, successful after 4 attempts. Magnesium infusion recommence. 0150: Patient encouraged to rest. Bps are better now. Pt denies headache and visual disturbance at this time. 0300: Pt resting quietly in bed.     0647: Dr. Jazmyne Silverman updated about pt, Bps reviewed. 0915: Lab called and Ms. Raghav Hernandez informed about magnesium level to be drawn    0710: Bedside shift report given to GINA Orta Rn

## 2022-04-22 NOTE — PROGRESS NOTES
[address] Dr Meaghan Mancilla notified of pt bp 168/108 prior to labetalol. I hour later 160/107 and pt staes she is seeing black spots floating. order to transfer pt to labor and delivery for evaluation.   1020  transferred pt to ld4 gave report to [address]

## 2022-04-22 NOTE — PROGRESS NOTES
Post-Partum Day Number 2 Progress Note    Kristi Liang       Information for the patient's :  Verla Floor [[id number]]   Vaginal, Spontaneous     Patient doing well without significant complaint. Breast/bottle feeding. Light lochia. In bed due to Magnesium precautions. +Luz. Tolerating reg diet. Review of Systems   Constitutional: Negative for chills and fever. Respiratory: Negative for shortness of breath. Cardiovascular: Negative for chest pain. Gastrointestinal: Negative for diarrhea, nausea and vomiting. Neurological: Positive for headaches (intermittent, 5/10). Negative for dizziness.        Vitals:  Visit Vitals  /80   Pulse 78   Temp 98.3 °F (36.8 °C)   Resp 18   Ht 5' 2\" (1.575 m)   Wt 185 lb (83.9 kg)   SpO2 100%   Breastfeeding Unknown   BMI 33.84 kg/m²     Temp (24hrs), Av.2 °F (36.8 °C), Min:97.9 °F (36.6 °C), Max:98.5 °F (36.9 °C)        Current Facility-Administered Medications:     ferrous sulfate tablet 325 mg, 1 Tablet, Oral, TID WITH MEALS, Darwin Alba MD, 325 mg at 22 1214    lactated Ringers infusion, 75 mL/hr, IntraVENous, CONTINUOUS, Darwin Alba MD, Last Rate: 75 mL/hr at 22, 75 mL/hr at 22    sodium chloride (NS) flush 5-40 mL, 5-40 mL, IntraVENous, PRN, Darwin Alba MD    acetaminophen (TYLENOL) tablet 650 mg, 650 mg, Oral, Q6H PRN **OR** acetaminophen (TYLENOL) suppository 650 mg, 650 mg, Rectal, Q6H PRN, Darwin Alba MD    calcium gluconate 1 gram in sodium chloride (ISO-OSM) 50 mL infusion, 1 g, IntraVENous, PRN, Darwin Alba MD    ondansetron [facility]) injection 4 mg, 4 mg, IntraVENous, Q6H PRN, Darwin Alba MD    magnesium sulfate 40 g/1000 mL Sterile Water infusion, 2 g/hr, IntraVENous, CONTINUOUS, Darwin Alba MD, Last Rate: 50 mL/hr at 22, 2 g/hr at 22 232    naloxone [facility]) injection 0.4 mg, 0.4 mg, IntraVENous, PRN, Daylin Salas MD    oxytocin (PITOCIN) 10 unit bolus from bag, 10 Units, IntraVENous, PRN **AND** oxytocin (PITOCIN) 30 units/500 ml LR, 87.3 shahid-units/min, IntraVENous, PRN, Marques Keith MD    docusate sodium (COLACE) capsule 100 mg, 100 mg, Oral, DAILY PRN, Marques Keith MD    measles, mumps & rubella Vacc (PF) (M-M-R II) injection 0.5 mL, 0.5 mL, SubCUTAneous, PRIOR TO DISCHARGE, Marques Keith MD    zolpidem [facility]) tablet 5 mg, 5 mg, Oral, QHS PRN, Marques Keith MD, 5 mg at 04/22/22 0109    witch hazel-glycerin (TUCKS) 12.5-50 % pads 1 Pad, 1 Pad, PeriANAL, PRN, Marques Keith MD, 1 Pad at 04/20/22 1707    hydrocortisone-pramoxine (ANALPRAM-HC) 2.5-1 % (4g) cream, , Topical, PRN, Marques Keith MD    oxyCODONE-acetaminophen (PERCOCET) 5-325 mg per tablet 2 Tablet, 2 Tablet, Oral, Q4H PRN, Marques Keith MD, 2 Tablet at 04/22/22 1214    levETIRAcetam (KEPPRA) tablet 750 mg, 750 mg, Oral, Q12H, Marques Keith MD, 750 mg at 04/22/22 0848    labetaloL (NORMODYNE) tablet 400 mg, 400 mg, Oral, Q12H, Marques Keith MD, 400 mg at 04/22/22 0847    ibuprofen (MOTRIN) tablet 800 mg, 800 mg, Oral, Q8H, Marques Keith MD, 800 mg at 04/22/22 0848    potassium chloride (K-DUR, KLOR-CON M20) SR tablet 40 mEq, 40 mEq, Oral, BID, Marques Keith MD, 40 mEq at 04/22/22 0848    calcium carbonate (TUMS) chewable tablet 200 mg [elemental], 200 mg, Oral, TID PRN, Marques Keith MD, 200 mg at 04/22/22 1216    loperamide (IMODIUM) capsule 2 mg, 2 mg, Oral, Q4H PRN, Marques Keith MD, 2 mg at 04/21/22 2125      Exam:   Patient without distress. Abdomen soft, fundus firm 2fb above umbilicus, nontender                Normal resp effort                Lower extremities are negative for swelling, cords or tenderness.     Labs:     Lab Results   Component Value Date/Time    WBC 9.0 04/21/2022 05:54 AM    WBC 8.4 04/20/2022 07:30 AM    WBC 8.1 04/17/2022 10:15 AM    WBC 7.8 04/17/2022 09:00 AM    WBC 4.5 04/16/2022 06:00 AM    WBC 7.1 04/15/2022 06:27 AM    WBC 4.7 2022 09:45 PM    HGB 6.7 (L) 2022 05:54 AM    HGB 6.5 (L) 2022 07:30 AM    HGB 6.7 (L) 2022 10:15 AM    HGB 6.6 (L) 2022 09:00 AM    HGB 6.9 (L) 2022 06:00 AM    HGB 7.0 (L) 04/15/2022 06:27 AM    HGB 7.3 (L) 2022 09:45 PM    HCT 23.1 (L) 2022 05:54 AM    HCT 22.7 (L) 2022 07:30 AM    HCT 22.6 (L) 2022 10:15 AM    HCT 22.7 (L) 2022 09:00 AM    HCT 23.3 (L) 2022 06:00 AM    HCT 23.3 (L) 04/15/2022 06:27 AM    HCT 24.5 (L) 2022 09:45 PM    PLATELET 254 8558 05:54 AM    PLATELET 790  07:30 AM    PLATELET 433  10:15 AM    PLATELET 748  09:00 AM    PLATELET 356  06:00 AM    PLATELET 113  06:27 AM    PLATELET 813 15/70/8163 09:45 PM       Recent Results (from the past 24 hour(s))   MAGNESIUM    Collection Time: 22 10:04 AM   Result Value Ref Range    Magnesium 6.5 (H) 1.6 - 2.4 mg/dL       Assessment:  PPD#2 on Magnesium for Pre-E with SFs    ICD-10-CM ICD-9-CM    1. Hypokalemia  E87.6 276.8    2. Dental abscess  K04.7 522.5    3. Microcytic anemia  D50.9 280.9    4. Pre-existing hypertension during pregnancy in second trimester, unspecified pre-existing hypertension type  O10.912 642.03         Plan:  1. PreE w/ SFs- on Magnesium therapy for 24 hrs total for seizure ppx. Continue Labetalol 400mg po Q12hr.   2. Severe anemia- on po iron, asx. 3. Continue Keppra for seizure d/o.  4. Hypokalemia has resolved. 5. Discharge planning: possible d/c home tomorrow.

## 2022-04-22 NOTE — PROGRESS NOTES
Called and informed of patient blood pressure  Patient denies headaches but reports seeing floaters  BP remains elevated despite PO antihypertensive dosage  Transfer patient to LD  Start Magnesium sulfate  Repeat labs  Strict I/O  Plan of care siriap

## 2022-04-22 NOTE — PROGRESS NOTES
0710-BEDSIDE REPORT RECEIVED FROM PREVIOUS SHIFT,WRITER ASSUMES CARE OF PT.     0815-ASSESSMENT AND PERICARE PERFORMED AT THIS TIME     1205-DR. PEDRO IN ROOM, ASSESSMENT PERFORMED AND ORDERS RECEIVED TO DISCONTINUE MAGNESIUM INFUSION 24 HOURS AFTER START, CONTINUE ALL OTHER MEDICATIONS

## 2022-04-22 NOTE — PROGRESS NOTES
Problem: Discharge Planning  Goal: *Discharge to safe environment  Outcome: Progressing Towards Goal  Goal: *Knowledge of medication management  Outcome: Progressing Towards Goal  Goal: *Knowledge of discharge instructions  Outcome: Progressing Towards Goal     Problem: Patient Education: Go to Patient Education Activity  Goal: Patient/Family Education  Outcome: Progressing Towards Goal     Problem: Pain  Goal: *Control of Pain  Outcome: Progressing Towards Goal     Problem: Patient Education: Go to Patient Education Activity  Goal: Patient/Family Education  Outcome: Progressing Towards Goal     Problem: Falls - Risk of  Goal: *Absence of Falls  Description: Document Yesika Fall Risk and appropriate interventions in the flowsheet.   Outcome: Progressing Towards Goal  Note: Fall Risk Interventions:            Medication Interventions: Teach patient to arise slowly                   Problem: Patient Education: Go to Patient Education Activity  Goal: Patient/Family Education  Outcome: Progressing Towards Goal     Problem: Hypertension  Goal: *Blood pressure within specified parameters  Outcome: Progressing Towards Goal  Goal: *Fluid volume balance  Outcome: Progressing Towards Goal  Goal: *Labs within defined limits  Outcome: Progressing Towards Goal     Problem: Patient Education: Go to Patient Education Activity  Goal: Patient/Family Education  Outcome: Progressing Towards Goal     Problem: Patient Education: Go to Patient Education Activity  Goal: Patient/Family Education  Outcome: Progressing Towards Goal     Problem: Vaginal Delivery: Day of Deliver-Laboring  Goal: Off Pathway (Use only if patient is Off Pathway)  Outcome: Progressing Towards Goal  Goal: Activity/Safety  Outcome: Progressing Towards Goal  Goal: Consults, if ordered  Outcome: Progressing Towards Goal  Goal: Diagnostic Test/Procedures  Outcome: Progressing Towards Goal  Goal: Nutrition/Diet  Outcome: Progressing Towards Goal  Goal: Discharge Planning  Outcome: Progressing Towards Goal  Goal: Medications  Outcome: Progressing Towards Goal  Goal: Respiratory  Outcome: Progressing Towards Goal  Goal: Treatments/Interventions/Procedures  Outcome: Progressing Towards Goal  Goal: *Vital signs within defined limits  Outcome: Progressing Towards Goal  Goal: *Labs within defined limits  Outcome: Progressing Towards Goal  Goal: *Hemodynamically stable  Outcome: Progressing Towards Goal  Goal: *Optimal pain control at patient's stated goal  Outcome: Progressing Towards Goal     Problem: Vaginal Delivery: Day of Delivery-Post delivery  Goal: Off Pathway (Use only if patient is Off Pathway)  Outcome: Progressing Towards Goal  Goal: Activity/Safety  Outcome: Progressing Towards Goal  Goal: Consults, if ordered  Outcome: Progressing Towards Goal  Goal: Nutrition/Diet  Outcome: Progressing Towards Goal  Goal: Discharge Planning  Outcome: Progressing Towards Goal  Goal: Medications  Outcome: Progressing Towards Goal  Goal: Treatments/Interventions/Procedures  Outcome: Progressing Towards Goal  Goal: *Vital signs within defined limits  Outcome: Progressing Towards Goal  Goal: *Labs within defined limits  Outcome: Progressing Towards Goal  Goal: *Hemodynamically stable  Outcome: Progressing Towards Goal  Goal: *Optimal pain control at patient's stated goal  Outcome: Progressing Towards Goal  Goal: *Participates in infant care  Outcome: Progressing Towards Goal  Goal: *Demonstrates progressive activity  Outcome: Progressing Towards Goal  Goal: *Tolerating diet  Outcome: Progressing Towards Goal     Problem: Vaginal Delivery: Postpartum Day 1  Goal: Off Pathway (Use only if patient is Off Pathway)  Outcome: Progressing Towards Goal  Goal: Activity/Safety  Outcome: Progressing Towards Goal  Goal: Consults, if ordered  Outcome: Progressing Towards Goal  Goal: Diagnostic Test/Procedures  Outcome: Progressing Towards Goal  Goal: Nutrition/Diet  Outcome: Progressing Towards Goal  Goal: Discharge Planning  Outcome: Progressing Towards Goal  Goal: Medications  Outcome: Progressing Towards Goal  Goal: Treatments/Interventions/Procedures  Outcome: Progressing Towards Goal  Goal: Psychosocial  Outcome: Progressing Towards Goal  Goal: *Vital signs within defined limits  Outcome: Progressing Towards Goal  Goal: *Labs within defined limits  Outcome: Progressing Towards Goal  Goal: *Hemodynamically stable  Outcome: Progressing Towards Goal  Goal: *Optimal pain control at patient's stated goal  Outcome: Progressing Towards Goal  Goal: *Participates in infant care  Outcome: Progressing Towards Goal  Goal: *Demonstrates progressive activity  Outcome: Progressing Towards Goal  Goal: *Performs self perineal care  Outcome: Progressing Towards Goal  Goal: *Appropriate parent-infant bonding  Outcome: Progressing Towards Goal  Goal: *Tolerating diet  Outcome: Progressing Towards Goal  Goal: *Performs self breast care  Outcome: Progressing Towards Goal     Problem: Vaginal Delivery: Postpartum 2  Goal: Off Pathway (Use only if patient is Off Pathway)  Outcome: Progressing Towards Goal  Goal: Activity/Safety  Outcome: Progressing Towards Goal  Goal: Consults, if ordered  Outcome: Progressing Towards Goal  Goal: Nutrition/Diet  Outcome: Progressing Towards Goal  Goal: Discharge Planning  Outcome: Progressing Towards Goal  Goal: Medications  Outcome: Progressing Towards Goal  Goal: Treatments/Interventions/Procedures  Outcome: Progressing Towards Goal  Goal: Psychosocial  Outcome: Progressing Towards Goal     Problem: Vaginal Delivery: Discharge Outcomes  Goal: *Verbalizes name, dosage, time, side effects, and number of days to continue medications  Outcome: Progressing Towards Goal  Goal: *Describes available resources and support systems  Outcome: Progressing Towards Goal  Goal: *No signs and symptoms of infection  Outcome: Progressing Towards Goal  Goal: *Birth certificate information completed  Outcome: Progressing Towards Goal  Goal: *Received and verbalizes understanding of discharge plan and instructions  Outcome: Progressing Towards Goal  Goal: *Vital signs within defined limits  Outcome: Progressing Towards Goal  Goal: *Labs within defined limits  Outcome: Progressing Towards Goal  Goal: *Hemodynamically stable  Outcome: Progressing Towards Goal  Goal: *Optimal pain control at patient's stated goal  Outcome: Progressing Towards Goal  Goal: *Participates in infant care  Outcome: Progressing Towards Goal  Goal: *Demonstrates progressive activity  Outcome: Progressing Towards Goal  Goal: *Appropriate parent-infant bonding  Outcome: Progressing Towards Goal  Goal: *Tolerating diet  Outcome: Progressing Towards Goal

## 2022-04-23 LAB — MAGNESIUM SERPL-MCNC: 4 MG/DL (ref 1.6–2.4)

## 2022-04-23 PROCEDURE — 74011250637 HC RX REV CODE- 250/637: Performed by: OBSTETRICS & GYNECOLOGY

## 2022-04-23 PROCEDURE — 83735 ASSAY OF MAGNESIUM: CPT

## 2022-04-23 PROCEDURE — 99024 POSTOP FOLLOW-UP VISIT: CPT | Performed by: OBSTETRICS & GYNECOLOGY

## 2022-04-23 PROCEDURE — 65410000002 HC RM PRIVATE OB

## 2022-04-23 PROCEDURE — 36415 COLL VENOUS BLD VENIPUNCTURE: CPT

## 2022-04-23 RX ORDER — LANOLIN ALCOHOL/MO/W.PET/CERES
325 CREAM (GRAM) TOPICAL
Qty: 90 TABLET | Refills: 0 | Status: SHIPPED | OUTPATIENT
Start: 2022-04-23

## 2022-04-23 RX ORDER — LABETALOL 200 MG/1
600 TABLET, FILM COATED ORAL 3 TIMES DAILY
Status: DISCONTINUED | OUTPATIENT
Start: 2022-04-23 | End: 2022-04-25 | Stop reason: HOSPADM

## 2022-04-23 RX ORDER — DOCUSATE SODIUM 100 MG/1
100 CAPSULE, LIQUID FILLED ORAL
Qty: 60 CAPSULE | Refills: 0 | Status: SHIPPED | OUTPATIENT
Start: 2022-04-23 | End: 2022-07-22

## 2022-04-23 RX ORDER — OXYCODONE AND ACETAMINOPHEN 5; 325 MG/1; MG/1
1 TABLET ORAL
Qty: 6 TABLET | Refills: 0 | Status: SHIPPED | OUTPATIENT
Start: 2022-04-23 | End: 2022-04-29

## 2022-04-23 RX ORDER — LABETALOL 200 MG/1
600 TABLET, FILM COATED ORAL EVERY 12 HOURS
Status: DISCONTINUED | OUTPATIENT
Start: 2022-04-23 | End: 2022-04-23

## 2022-04-23 RX ORDER — IBUPROFEN 800 MG/1
800 TABLET ORAL
Qty: 60 TABLET | Refills: 0 | Status: SHIPPED | OUTPATIENT
Start: 2022-04-23

## 2022-04-23 RX ORDER — NIFEDIPINE 30 MG/1
30 TABLET, EXTENDED RELEASE ORAL DAILY
Status: DISCONTINUED | OUTPATIENT
Start: 2022-04-23 | End: 2022-04-24

## 2022-04-23 RX ORDER — LABETALOL 200 MG/1
200 TABLET, FILM COATED ORAL
Status: COMPLETED | OUTPATIENT
Start: 2022-04-23 | End: 2022-04-23

## 2022-04-23 RX ADMIN — ZOLPIDEM TARTRATE 5 MG: 5 TABLET ORAL at 01:36

## 2022-04-23 RX ADMIN — IBUPROFEN 800 MG: 800 TABLET, FILM COATED ORAL at 01:36

## 2022-04-23 RX ADMIN — POTASSIUM CHLORIDE 40 MEQ: 20 TABLET, EXTENDED RELEASE ORAL at 09:43

## 2022-04-23 RX ADMIN — LABETALOL HYDROCHLORIDE 600 MG: 200 TABLET, FILM COATED ORAL at 19:09

## 2022-04-23 RX ADMIN — LEVETIRACETAM 750 MG: 250 TABLET, FILM COATED ORAL at 09:43

## 2022-04-23 RX ADMIN — IBUPROFEN 800 MG: 800 TABLET, FILM COATED ORAL at 17:16

## 2022-04-23 RX ADMIN — FERROUS SULFATE TAB 325 MG (65 MG ELEMENTAL FE) 325 MG: 325 (65 FE) TAB at 09:43

## 2022-04-23 RX ADMIN — LABETALOL HYDROCHLORIDE 200 MG: 200 TABLET, FILM COATED ORAL at 12:30

## 2022-04-23 RX ADMIN — FERROUS SULFATE TAB 325 MG (65 MG ELEMENTAL FE) 325 MG: 325 (65 FE) TAB at 12:30

## 2022-04-23 RX ADMIN — LABETALOL HYDROCHLORIDE 400 MG: 200 TABLET, FILM COATED ORAL at 09:43

## 2022-04-23 RX ADMIN — NIFEDIPINE 30 MG: 30 TABLET, FILM COATED, EXTENDED RELEASE ORAL at 23:10

## 2022-04-23 RX ADMIN — LEVETIRACETAM 750 MG: 250 TABLET, FILM COATED ORAL at 22:19

## 2022-04-23 RX ADMIN — ZOLPIDEM TARTRATE 5 MG: 5 TABLET ORAL at 23:15

## 2022-04-23 RX ADMIN — POTASSIUM CHLORIDE 40 MEQ: 20 TABLET, EXTENDED RELEASE ORAL at 20:51

## 2022-04-23 RX ADMIN — OXYCODONE AND ACETAMINOPHEN 2 TABLET: 5; 325 TABLET ORAL at 01:36

## 2022-04-23 RX ADMIN — IBUPROFEN 800 MG: 800 TABLET, FILM COATED ORAL at 09:43

## 2022-04-23 RX ADMIN — OXYCODONE AND ACETAMINOPHEN 2 TABLET: 5; 325 TABLET ORAL at 20:54

## 2022-04-23 RX ADMIN — OXYCODONE AND ACETAMINOPHEN 2 TABLET: 5; 325 TABLET ORAL at 12:30

## 2022-04-23 RX ADMIN — FERROUS SULFATE TAB 325 MG (65 MG ELEMENTAL FE) 325 MG: 325 (65 FE) TAB at 17:16

## 2022-04-23 NOTE — PROGRESS NOTES
0715- Bedside shift change report received from GEORGE Lino RN. Pt sleeping at this time. 5672- Vital signs assessed. Shift assessment performed. Scheduled PO medications administered. See flowsheet and MAR for details.

## 2022-04-23 NOTE — PROGRESS NOTES
1900-assumed care of pt. Pt resting in bed. No support at bedside. Pt has no complaints. Pt aware of plan of care at this time. 2045-nicu notified that pt would like to come see infant. 2110-pt to see baby in nicu. Bps reviewed with md.     2222-pt back to room. md on phone. Bps reviewed. Orders taken    0235-pt complains of breast pain. Pt states that she is leaking. Pt encouraged to pump. Breast pump set up. Pt states that she is also having a frontal headache. No other complaints.  md aware of pt bp    0330-bps reviewed with md. Orders taken    0445-md updated on bp

## 2022-04-23 NOTE — PROGRESS NOTES
1900-assumed care of pt. Pt aware of plan of care for the night. No support at bedside. Pt has no complaints at this time. 2040-vitals reviewed with md. Pt complains of headache, frontal, at this time. No other s/s. md aware. 0050-pt complains of headache. md aware. Pt offered meds, per mar. Pt requests to get up and shower. Pass egress. Gait steady. Pt up to br. Pt urinated without difficulty. Shower started. Linens  Changed.

## 2022-04-23 NOTE — PROGRESS NOTES
Post-Partum Day Number 3 Progress Note    Verl Maxin       Information for the patient's :  Verla Floor [[id number]]   Vaginal, Spontaneous     Patient doing well without significant complaint. Still has intermittent headaches-awaiting pain med as these resolve them. Breast/bottle feeding. Moderate lochia. OOB and ambulating. Voiding without difficulty. Tolerating reg diet. Review of Systems   Constitutional: Negative for chills and fever. Eyes: Negative for blurred vision. Respiratory: Negative for shortness of breath. Cardiovascular: Negative for chest pain. Gastrointestinal: Negative for diarrhea, nausea and vomiting.        Vitals:  Visit Vitals  BP (!) 141/92   Pulse 91   Temp 98.5 °F (36.9 °C)   Resp 18   Ht 5' 2\" (1.575 m)   Wt 185 lb (83.9 kg)   SpO2 100%   Breastfeeding Unknown   BMI 33.84 kg/m²     Temp (24hrs), Av.5 °F (36.9 °C), Min:98.5 °F (36.9 °C), Max:98.5 °F (36.9 °C)        Current Facility-Administered Medications:     ferrous sulfate tablet 325 mg, 1 Tablet, Oral, TID WITH MEALS, Darwin Alba MD, 325 mg at 22 1723    lactated Ringers infusion, 75 mL/hr, IntraVENous, CONTINUOUS, Darwin Alba MD, Last Rate: 75 mL/hr at 228, 75 mL/hr at 22    sodium chloride (NS) flush 5-40 mL, 5-40 mL, IntraVENous, PRN, Darwin Alba MD    acetaminophen (TYLENOL) tablet 650 mg, 650 mg, Oral, Q6H PRN, 650 mg at 22 2041 **OR** acetaminophen (TYLENOL) suppository 650 mg, 650 mg, Rectal, Q6H PRN, Darwin Alba MD    calcium gluconate 1 gram in sodium chloride (ISO-OSM) 50 mL infusion, 1 g, IntraVENous, PRN, Darwin Alba MD    ondansetron [facility]) injection 4 mg, 4 mg, IntraVENous, Q6H PRN, Darwin Alba MD    magnesium sulfate 40 g/1000 mL Sterile Water infusion, 2 g/hr, IntraVENous, CONTINUOUS, Darwin Alba MD, Stopped at 22 1494    naloxone [facility]) injection 0.4 mg, 0.4 mg, IntraVENous, PRN, Daylin Salas MD  [facility] oxytocin (PITOCIN) 10 unit bolus from bag, 10 Units, IntraVENous, PRN **AND** oxytocin (PITOCIN) 30 units/500 ml LR, 87.3 shahid-units/min, IntraVENous, PRN, Christine Feliz MD    docusate sodium (COLACE) capsule 100 mg, 100 mg, Oral, DAILY PRN, Christine Feliz MD    measles, mumps & rubella Vacc (PF) (M-M-R II) injection 0.5 mL, 0.5 mL, SubCUTAneous, PRIOR TO DISCHARGE, Christine Feliz MD    zolpidem [facility]) tablet 5 mg, 5 mg, Oral, QHS PRN, Christine Feliz MD, 5 mg at 04/23/22 0136    witch hazel-glycerin (TUCKS) 12.5-50 % pads 1 Pad, 1 Pad, PeriANAL, PRN, Christine Feliz MD, 1 Pad at 04/20/22 1707    hydrocortisone-pramoxine (ANALPRAM-HC) 2.5-1 % (4g) cream, , Topical, PRN, Christine Feliz MD    oxyCODONE-acetaminophen (PERCOCET) 5-325 mg per tablet 2 Tablet, 2 Tablet, Oral, Q4H PRN, Christine Feliz MD, 2 Tablet at 04/23/22 0136    levETIRAcetam (KEPPRA) tablet 750 mg, 750 mg, Oral, Q12H, Christine Feliz MD, 750 mg at 04/23/22 0943    labetaloL (NORMODYNE) tablet 400 mg, 400 mg, Oral, Q12H, Christine Feliz MD, 400 mg at 04/23/22 0943    ibuprofen (MOTRIN) tablet 800 mg, 800 mg, Oral, Q8H, Christine Feliz MD, 800 mg at 04/23/22 0943    potassium chloride (K-DUR, KLOR-CON M20) SR tablet 40 mEq, 40 mEq, Oral, BID, Christine Feliz MD, 40 mEq at 04/23/22 0133    calcium carbonate (TUMS) chewable tablet 200 mg [elemental], 200 mg, Oral, TID PRN, Christine Feliz MD, 200 mg at 04/22/22 1216    loperamide (IMODIUM) capsule 2 mg, 2 mg, Oral, Q4H PRN, Christine Feliz MD, 2 mg at 04/21/22 2125      Exam:   Patient without distress. Abdomen soft, fundus firm 2 fingerbreaths below umbilicus, nontender                Normal resp effort                Lower extremities are negative for swelling, cords or tenderness.     Labs:     Lab Results   Component Value Date/Time    WBC 9.0 04/21/2022 05:54 AM    WBC 8.4 04/20/2022 07:30 AM    WBC 8.1 04/17/2022 10:15 AM    WBC 7.8 04/17/2022 09:00 AM    WBC 4.5 2022 06:00 AM    WBC 7.1 04/15/2022 06:27 AM    WBC 4.7 2022 09:45 PM    HGB 6.7 (L) 2022 05:54 AM    HGB 6.5 (L) 2022 07:30 AM    HGB 6.7 (L) 2022 10:15 AM    HGB 6.6 (L) 2022 09:00 AM    HGB 6.9 (L) 2022 06:00 AM    HGB 7.0 (L) 04/15/2022 06:27 AM    HGB 7.3 (L) 2022 09:45 PM    HCT 23.1 (L) 2022 05:54 AM    HCT 22.7 (L) 2022 07:30 AM    HCT 22.6 (L) 2022 10:15 AM    HCT 22.7 (L) 2022 09:00 AM    HCT 23.3 (L) 2022 06:00 AM    HCT 23.3 (L) 04/15/2022 06:27 AM    HCT 24.5 (L) 2022 09:45 PM    PLATELET 549  05:54 AM    PLATELET 746  07:30 AM    PLATELET 681  10:15 AM    PLATELET 845  09:00 AM    PLATELET 048  06:00 AM    PLATELET 975  06:27 AM    PLATELET 499  09:45 PM       Recent Results (from the past 24 hour(s))   MAGNESIUM    Collection Time: 22  4:58 PM   Result Value Ref Range    Magnesium 7.2 (H) 1.6 - 2.4 mg/dL   MAGNESIUM    Collection Time: 22  5:59 AM   Result Value Ref Range    Magnesium 4.0 (H) 1.6 - 2.4 mg/dL       Assessment: PPD#3, superimposed Pre-E with SFs    ICD-10-CM ICD-9-CM    1. Hypokalemia  E87.6 276.8    2. Dental abscess  K04.7 522.5    3. Microcytic anemia  D50.9 280.9    4. Pre-existing hypertension during pregnancy in second trimester, unspecified pre-existing hypertension type  O10.912 642.03         Plan:  1. Superimposed PreE w/ SFs- s/p Magnesium therapy for 24 hrs total for seizure ppx. Change Labetalol to 600mg po Q12hr. Hold discharge until Bps consistently controlled. 2. Severe anemia- on po iron, asx. 3. Continue Keppra for seizure d/o.  4. Hypokalemia has resolved. 5. Discharge planning: possible d/c home this evening vs tomorrow.

## 2022-04-24 PROBLEM — F32.9 MAJOR DEPRESSIVE DISORDER WITH CURRENT ACTIVE EPISODE: Status: ACTIVE | Noted: 2022-04-24

## 2022-04-24 PROBLEM — R51.9 HEADACHE: Status: ACTIVE | Noted: 2022-04-24

## 2022-04-24 PROBLEM — Z86.59 HISTORY OF DEPRESSION: Status: ACTIVE | Noted: 2022-04-24

## 2022-04-24 PROCEDURE — 74011250637 HC RX REV CODE- 250/637: Performed by: OBSTETRICS & GYNECOLOGY

## 2022-04-24 PROCEDURE — 65410000002 HC RM PRIVATE OB

## 2022-04-24 RX ORDER — OXYCODONE AND ACETAMINOPHEN 5; 325 MG/1; MG/1
1 TABLET ORAL
Status: DISCONTINUED | OUTPATIENT
Start: 2022-04-24 | End: 2022-04-25 | Stop reason: HOSPADM

## 2022-04-24 RX ORDER — CITALOPRAM 20 MG/1
20 TABLET, FILM COATED ORAL DAILY
Status: DISCONTINUED | OUTPATIENT
Start: 2022-04-24 | End: 2022-04-25 | Stop reason: HOSPADM

## 2022-04-24 RX ORDER — DIPHENHYDRAMINE HCL 25 MG
25 CAPSULE ORAL
Status: DISCONTINUED | OUTPATIENT
Start: 2022-04-24 | End: 2022-04-24

## 2022-04-24 RX ORDER — NIFEDIPINE 30 MG/1
30 TABLET, EXTENDED RELEASE ORAL 2 TIMES DAILY
Status: DISCONTINUED | OUTPATIENT
Start: 2022-04-24 | End: 2022-04-25 | Stop reason: HOSPADM

## 2022-04-24 RX ORDER — DIPHENHYDRAMINE HCL 25 MG
25 CAPSULE ORAL
Status: DISCONTINUED | OUTPATIENT
Start: 2022-04-24 | End: 2022-04-25 | Stop reason: HOSPADM

## 2022-04-24 RX ORDER — METOCLOPRAMIDE 10 MG/1
10 TABLET ORAL
Status: DISCONTINUED | OUTPATIENT
Start: 2022-04-24 | End: 2022-04-24

## 2022-04-24 RX ORDER — METOCLOPRAMIDE HYDROCHLORIDE 5 MG/5ML
10 SOLUTION ORAL
Status: DISCONTINUED | OUTPATIENT
Start: 2022-04-24 | End: 2022-04-24

## 2022-04-24 RX ADMIN — LABETALOL HYDROCHLORIDE 600 MG: 200 TABLET, FILM COATED ORAL at 16:12

## 2022-04-24 RX ADMIN — LEVETIRACETAM 750 MG: 250 TABLET, FILM COATED ORAL at 09:12

## 2022-04-24 RX ADMIN — OXYCODONE AND ACETAMINOPHEN 1 TABLET: 5; 325 TABLET ORAL at 17:39

## 2022-04-24 RX ADMIN — CITALOPRAM HYDROBROMIDE 20 MG: 20 TABLET ORAL at 16:11

## 2022-04-24 RX ADMIN — IBUPROFEN 800 MG: 800 TABLET, FILM COATED ORAL at 23:16

## 2022-04-24 RX ADMIN — FERROUS SULFATE TAB 325 MG (65 MG ELEMENTAL FE) 325 MG: 325 (65 FE) TAB at 16:11

## 2022-04-24 RX ADMIN — ACETAMINOPHEN 650 MG: 325 TABLET ORAL at 22:11

## 2022-04-24 RX ADMIN — LABETALOL HYDROCHLORIDE 600 MG: 200 TABLET, FILM COATED ORAL at 22:11

## 2022-04-24 RX ADMIN — OXYCODONE AND ACETAMINOPHEN 2 TABLET: 5; 325 TABLET ORAL at 02:37

## 2022-04-24 RX ADMIN — OXYCODONE AND ACETAMINOPHEN 1 TABLET: 5; 325 TABLET ORAL at 12:11

## 2022-04-24 RX ADMIN — CALCIUM CARBONATE 200 MG: 500 TABLET, CHEWABLE ORAL at 17:39

## 2022-04-24 RX ADMIN — DIPHENHYDRAMINE HYDROCHLORIDE 25 MG: 25 CAPSULE ORAL at 04:01

## 2022-04-24 RX ADMIN — NIFEDIPINE 30 MG: 30 TABLET, FILM COATED, EXTENDED RELEASE ORAL at 20:03

## 2022-04-24 RX ADMIN — OXYCODONE AND ACETAMINOPHEN 1 TABLET: 5; 325 TABLET ORAL at 23:15

## 2022-04-24 RX ADMIN — IBUPROFEN 800 MG: 800 TABLET, FILM COATED ORAL at 09:12

## 2022-04-24 RX ADMIN — IBUPROFEN 800 MG: 800 TABLET, FILM COATED ORAL at 16:12

## 2022-04-24 RX ADMIN — LABETALOL HYDROCHLORIDE 600 MG: 200 TABLET, FILM COATED ORAL at 03:30

## 2022-04-24 RX ADMIN — LEVETIRACETAM 750 MG: 250 TABLET, FILM COATED ORAL at 20:04

## 2022-04-24 RX ADMIN — IBUPROFEN 800 MG: 800 TABLET, FILM COATED ORAL at 02:35

## 2022-04-24 RX ADMIN — FERROUS SULFATE TAB 325 MG (65 MG ELEMENTAL FE) 325 MG: 325 (65 FE) TAB at 09:12

## 2022-04-24 RX ADMIN — ZOLPIDEM TARTRATE 5 MG: 5 TABLET ORAL at 23:15

## 2022-04-24 RX ADMIN — NIFEDIPINE 30 MG: 30 TABLET, FILM COATED, EXTENDED RELEASE ORAL at 12:11

## 2022-04-24 RX ADMIN — LABETALOL HYDROCHLORIDE 600 MG: 200 TABLET, FILM COATED ORAL at 09:12

## 2022-04-24 RX ADMIN — FERROUS SULFATE TAB 325 MG (65 MG ELEMENTAL FE) 325 MG: 325 (65 FE) TAB at 12:11

## 2022-04-24 NOTE — PROGRESS NOTES
8815- Bedside shift change report received from GEORGE Soto RN. Pt sleeping comfortably at this time. 4281- Vital signs assessed and shift assessment performed. Scheduled PO medications administered. 18- MD in pt's room talking to her regarding her depression score. 1208- BP assessed, MD notified. Scheduled PO medications administered. Pt rating headache 7/10, see MAR for intervention. Pt pumping at this time. Pt provided cup of ice and ginger ale. 1235- Pt provided Special Delivery meal for lunch. [address] taken to nicu. Pt in bed sleeping. 1910- Bedside shift change report given to oncoming RN.

## 2022-04-24 NOTE — PROGRESS NOTES
Post-Partum Day Number 4 Progress Note    Bebe Bettencourt       Information for the patient's :  Yeni Rose [[id number]]   Vaginal, Spontaneous     Patient states HA improved with Ibuprofen, percocet, and the benadryl. It recurs. Currently 7/10. Pumping. Moderate lochia. OOB and ambulating. Voiding without difficulty. Tolerating reg diet. Depression screen scored at 15. Patient has h/o depression and was on Celexa, Zolpidem, and Xanax prn prior, stopped with pregnancy. Denies S/HI or A/V hallucinations. Review of Systems   Constitutional: Negative for chills and fever. Eyes: Negative for blurred vision. Respiratory: Negative for shortness of breath. Cardiovascular: Negative for chest pain. Gastrointestinal: Negative for diarrhea, nausea and vomiting.        Vitals:  Visit Vitals  BP (!) 155/104   Pulse 74   Temp 98.2 °F (36.8 °C)   Resp 18   Ht 5' 2\" (1.575 m)   Wt 185 lb (83.9 kg)   SpO2 99%   Breastfeeding Unknown   BMI 33.84 kg/m²     Temp (24hrs), Av.5 °F (36.9 °C), Min:98.2 °F (36.8 °C), Max:98.7 °F (37.1 °C)        Current Facility-Administered Medications:     diphenhydrAMINE (BENADRYL) capsule 25 mg, 25 mg, Oral, Q6H PRN, Justine Michael MD, 25 mg at 22 0401    oxyCODONE-acetaminophen (PERCOCET) 5-325 mg per tablet 1 Tablet, 1 Tablet, Oral, Q6H PRN, Shyanne Plunkett MD, 1 Tablet at 22 1211    NIFEdipine ER (PROCARDIA XL) tablet 30 mg, 30 mg, Oral, BID, Justine Michael MD, 30 mg at 22 1211    citalopram (CELEXA) tablet 20 mg, 20 mg, Oral, DAILY, Justine Michael MD    labetaloL (NORMODYNE) tablet 600 mg, 600 mg, Oral, TID, Justine Michael MD, 600 mg at 22 4580    ferrous sulfate tablet 325 mg, 1 Tablet, Oral, TID WITH MEALS, Yecenia Lui MD, 325 mg at 22 1211    sodium chloride (NS) flush 5-40 mL, 5-40 mL, IntraVENous, PRN, Robina Meraz MD    acetaminophen (TYLENOL) tablet 650 mg, 650 mg, Oral, Q6H PRN, 650 mg at 04/22/22 2041 **OR** acetaminophen (TYLENOL) suppository 650 mg, 650 mg, Rectal, Q6H PRN, Rony Carmen MD    ondansetron [facility]) injection 4 mg, 4 mg, IntraVENous, Q6H PRN, Rony Carmen MD    docusate sodium (COLACE) capsule 100 mg, 100 mg, Oral, DAILY PRN, Emmanuel Valero MD    measles, mumps & rubella Vacc (PF) (M-M-R II) injection 0.5 mL, 0.5 mL, SubCUTAneous, PRIOR TO DISCHARGE, Emmanuel Valero MD    zolpidem [facility]) tablet 5 mg, 5 mg, Oral, QHS PRN, Emmanuel Valero MD, 5 mg at 04/23/22 2315    witch hazel-glycerin (TUCKS) 12.5-50 % pads 1 Pad, 1 Pad, PeriANAL, PRN, Emmanuel Valero MD, 1 Pad at 04/20/22 1707    hydrocortisone-pramoxine (ANALPRAM-HC) 2.5-1 % (4g) cream, , Topical, PRN, Emmanuel Valero MD    levETIRAcetam (KEPPRA) tablet 750 mg, 750 mg, Oral, Q12H, Emmanuel Valero MD, 750 mg at 04/24/22 0912    ibuprofen (MOTRIN) tablet 800 mg, 800 mg, Oral, Q8H, Emmanule Valero MD, 800 mg at 04/24/22 8217    calcium carbonate (TUMS) chewable tablet 200 mg [elemental], 200 mg, Oral, TID PRN, Emmanuel Valero MD, 200 mg at 04/22/22 1216    loperamide (IMODIUM) capsule 2 mg, 2 mg, Oral, Q4H PRN, Emmanuel Valero MD, 2 mg at 04/21/22 2125      Exam:   Patient without distress, speaking comfortably                Abdomen soft, fundus firm 2 FBs below umbilicus, minimally TTP                Normal resp effort                Lower extremities are negative for swelling, cords or tenderness.     Labs:     Lab Results   Component Value Date/Time    WBC 9.0 04/21/2022 05:54 AM    WBC 8.4 04/20/2022 07:30 AM    WBC 8.1 04/17/2022 10:15 AM    WBC 7.8 04/17/2022 09:00 AM    WBC 4.5 04/16/2022 06:00 AM    WBC 7.1 04/15/2022 06:27 AM    WBC 4.7 04/14/2022 09:45 PM    HGB 6.7 (L) 04/21/2022 05:54 AM    HGB 6.5 (L) 04/20/2022 07:30 AM    HGB 6.7 (L) 04/17/2022 10:15 AM    HGB 6.6 (L) 04/17/2022 09:00 AM    HGB 6.9 (L) 04/16/2022 06:00 AM    HGB 7.0 (L) 04/15/2022 06:27 AM    HGB 7.3 (L) 04/14/2022 09:45 PM    HCT 23.1 (L) 2022 05:54 AM    HCT 22.7 (L) 2022 07:30 AM    HCT 22.6 (L) 2022 10:15 AM    HCT 22.7 (L) 2022 09:00 AM    HCT 23.3 (L) 2022 06:00 AM    HCT 23.3 (L) 04/15/2022 06:27 AM    HCT 24.5 (L) 2022 09:45 PM    PLATELET 734  05:54 AM    PLATELET 073  07:30 AM    PLATELET 322  10:15 AM    PLATELET 972  09:00 AM    PLATELET 915  06:00 AM    PLATELET 847  06:27 AM    PLATELET 129  09:45 PM       No results found for this or any previous visit (from the past 24 hour(s)). Assessment: Doing well, post partum day 4    ICD-10-CM ICD-9-CM    1. Hypokalemia  E87.6 276.8    2. Dental abscess  K04.7 522.5    3. Microcytic anemia  D50.9 280.9 ferrous sulfate 325 mg (65 mg iron) tablet      docusate sodium (COLACE) 100 mg capsule   4. Pre-existing hypertension during pregnancy in second trimester, unspecified pre-existing hypertension type  O10.912 642.03    5.  (spontaneous vaginal delivery)  O80 650 ibuprofen (MOTRIN) 800 mg tablet      oxyCODONE-acetaminophen (Percocet) 5-325 mg per tablet        Plan:  1.  Superimposed PreE w/ SFs- s/p Magnesium therapy for 24 hrs total for seizure ppx. BP meds adjusted overnight to Labetalol 600mg po TID, and Nifedipine ER 30mg po daily. Increase Nifedipine to BID and continue to monitor Bps as not well controlled yet. 2. HA- MRI brain 22 was wnl. Continue prn benadryl, percocet. 3. Depression- Accepts resuming Celexa. Taking Zolpidem here. Will f/u with psych outpatient. 4. Discharge planning: on hold until Bps optimally controlled.

## 2022-04-25 VITALS
RESPIRATION RATE: 15 BRPM | OXYGEN SATURATION: 98 % | TEMPERATURE: 98.4 F | WEIGHT: 185 LBS | HEIGHT: 62 IN | SYSTOLIC BLOOD PRESSURE: 126 MMHG | HEART RATE: 84 BPM | DIASTOLIC BLOOD PRESSURE: 86 MMHG | BODY MASS INDEX: 34.04 KG/M2

## 2022-04-25 PROCEDURE — 99238 HOSP IP/OBS DSCHRG MGMT 30/<: CPT | Performed by: OBSTETRICS & GYNECOLOGY

## 2022-04-25 PROCEDURE — 74011250637 HC RX REV CODE- 250/637: Performed by: OBSTETRICS & GYNECOLOGY

## 2022-04-25 RX ORDER — NIFEDIPINE 30 MG/1
30 TABLET, EXTENDED RELEASE ORAL 2 TIMES DAILY
Qty: 30 TABLET | Refills: 1 | Status: SHIPPED | OUTPATIENT
Start: 2022-04-25

## 2022-04-25 RX ORDER — LABETALOL 300 MG/1
600 TABLET, FILM COATED ORAL 3 TIMES DAILY
Qty: 90 TABLET | Refills: 1 | Status: SHIPPED | OUTPATIENT
Start: 2022-04-25

## 2022-04-25 RX ADMIN — FERROUS SULFATE TAB 325 MG (65 MG ELEMENTAL FE) 325 MG: 325 (65 FE) TAB at 08:03

## 2022-04-25 RX ADMIN — LABETALOL HYDROCHLORIDE 600 MG: 200 TABLET, FILM COATED ORAL at 09:00

## 2022-04-25 RX ADMIN — IBUPROFEN 800 MG: 800 TABLET, FILM COATED ORAL at 08:03

## 2022-04-25 RX ADMIN — OXYCODONE AND ACETAMINOPHEN 1 TABLET: 5; 325 TABLET ORAL at 06:01

## 2022-04-25 RX ADMIN — LEVETIRACETAM 750 MG: 250 TABLET, FILM COATED ORAL at 08:04

## 2022-04-25 RX ADMIN — NIFEDIPINE 30 MG: 30 TABLET, FILM COATED, EXTENDED RELEASE ORAL at 08:03

## 2022-04-25 NOTE — DISCHARGE SUMMARY
DISCHARGE SUMMARY    ADMITTING DIAGNOSIS:  Intrauterine pregnancy at 33-3/7-week gestation  Chronic hypertension with superimposed severe preeclampsia  Advanced maternal age  No prenatal care  Dental abscess  History of seizure disorder    DISCHARGE DIAGNOSIS:  Intrauterine pregnancy at 34-week gestation delivered  Chronic hypertension with superimposed severe preeclampsia  Advanced maternal age  No prenatal care  Dental abscess  History of seizure disorder    PROCEDURES PERFORMED: Vaginal delivery    HISTORY OF PRESENT ILLNESS: 39years old -1-1-4 patient admitted with pregnancy at 33-3/7-week gestation according to late ultrasound. Patient had no prenatal care. Induction of labor at 34 weeks performed due to chronic hypertension with superimposed preeclampsia with severe feature. She is actually hemodynamically stable, without further complications. OBJECTIVE:  VITALS:  Visit Vitals  BP (!) 145/97   Pulse 78   Temp 98.4 °F (36.9 °C)   Resp 15   Ht 5' 2\" (1.575 m)   Wt 83.9 kg (185 lb)   SpO2 100%   Breastfeeding Unknown   BMI 33.84 kg/m²     HEART: Regular rhythm, no murmur  LUNGS: Clear to auscultation at both fields  ABDOMEN: Soft and depressible, normal bowel sounds  PELVIC: Normal findings    LABORATORY:  I have reviewed all the lab results. HOSPITAL COURSE: She is actually hemodynamically stable, afebrile, ambulating, tolerating diet, voiding spontaneously, with adequate urine output, adequate pain control, without complications. DISCHARGE MEDICATIONS: See med rec    DIET: Regular. Advance as tolerated. ACTIVITY: Advance as tolerated. Pelvic rest for 6 weeks. No heavy lifting    DISCHARGE INSTRUCTIONS: Discharge to home, call for increased pain, fever or bleeding. FOLLOW-UP: Appointment to clinic.     I spent 30 minutes or less with the patient to perform final examination, discuss the hospital stay, give instructions for continuing care to all relevant caregivers and prepare discharge records, prescriptions and referral forms.

## 2022-04-25 NOTE — PROGRESS NOTES
1421: Bedside shift report received from COLLEEN Howard RN. Patient sleeping at this time. 0800: Writer at bedside for assessment. Patient says she is feeling good this morning minus a frontal sinus like headache. Morning medications given along with breakfast tray. Will continue with plan of care. 5929: Dr. Yamel Laguerre at bedside. Patient to be discharged home today. Patient understanding of plan and medications she will be prescribed for home. Educated provided to patient by MD about importnace of continuing medication regimen and following up next week with OBGYN and PCP for continuation of care. Patient to follow up with Dr. Tyrone Lux. Labetalol given. Celexa to be given when pharmacy sends it, patient aware and understanding. 0935: Discharge instructions reviewed with patient. Discussed with patient postpartum care instructions as listed in handout that was given. Patient educated on importance of following up with her OBGYN and primary care to continue with medication regimen. List of our OBGYN providers was given to patient so she could contact them if she could not get in with her primary OB. Patient verbalized understanding. Patient signed discharge form. 1000: Patient to get ride home and be out of room by 12 noon. Understanding that care is completed at this time. 1225: Patient wheeled down to front entrance. Patient to return later with car seat for car seat trial later. Care relinquished at this time.

## 2022-04-25 NOTE — PROGRESS NOTES
1910: Assumed care of pt at this time. Bedside report received from Kassi Campos RN. Pt has no complaints at this time.

## 2022-05-20 PROBLEM — E87.6 HYPOKALEMIA: Status: RESOLVED | Noted: 2022-04-14 | Resolved: 2022-04-22

## 2022-08-24 NOTE — PROGRESS NOTES
6587- Bedside change of shift report received from COLLEEN Garcia RN. R lower forearm IV no longer working. Peripheral IV discontinued, hemostasis achieved, tip intact, site covered w/ band-aid.    2803- Vital signs assessed, see flowsheet. Pt hooked up to EFM for intermittent monitoring. 12- Dr. Marikay Halsted at bedside discussing plan of care w/ pt.    0815- Dietary delivered breakfast tray to pt's room. 4392- Pt disconnected from EFM for intermittent monitoring. IV antibiotics completed. Flushed R A/C IV to saline lock, peripheral IV noted to be leaking at this time. Peripheral IV discontinued, hemostasis achieved, tip intact, site covered w/ band-aid. BP assessed, 157/85. Administered scheduled PO labetalol and keppra. Pt rating dental pain 7/10, see MAR.    840- BP rechecked, now 143/92.    0851- Pt up to bathroom to void and wash up.    5702- Peripheral IV started in L wrist, blood work collected and tubed to lab. Urine sample collected for urine protein/creatinine ratio and tubed to lab. Pt sitting up on side of bed to start eating breakfast.    40 [facility] called in attempt to get records from pt's prenatal appointment w/ Dr. Devika Sanchez at beginning of pregnancy. Nurse and on call MD looked into multiple record systems and was unable to find anything on the pt. Went to pt's room to discuss w/ her when and where she saw Dr. Devika Sacnhez. Pt having difficulty recalling exactly when and where she saw him. States she was pregnant last year right before this pregnancy, and had an . Pt unsure whether she is \"confusing seeing him this pregnancy or the one that she aborted just months before. \" Pt states \"it must have been this pregnancy, though; because with the last pregnancy, I never saw Dr. Devika Sanchez. I went straight to the  clinic. They did an ultrasound that day to see how far along I was, and then did the . \" Pt states \"I remember having a period around my birthday, and that is the last period I recall having. \" Pt states \"and I must have seen him here. I've always seen him at the office right here next to the hospital w/ this hospital group. Then, they scheduled me for an appointment at the [place] office, but I didn't make it to that appointment. \"     4361- Looked through our EMR again in attempt to find any possible notes, doctor's visits, labwork, or vital signs that may have been documented in the last few months, unable to find anything. 1105- BP assessed. Pt rating dental pain 6/10. Breakfast tray removed from pt's room. Pt provided new bed sheets. 1210- Dietary delivered lunch tray to pt.    1259- Scheduled IV unasyn administered. Pt sitting up eating lunch. 1341- Pt hooked [facility] to [facility] for [facility] monitoring. BP assessed, see flowsheet. 1416- Intermittent monitoring complete, pt disconnected from EFM. 1521- Vital signs assessed, see flowsheet. Pt rating dental pain 6/10, see MAR.    1724- BP assessed, see flowsheet. Dietary delivered dinner tray. PRN PO tums administered for c/o heartburn. 1835- Scheduled IV unasyn administered. 1905- Bedside shift change report given to oncoming RN. Saucerization Excision Additional Text (Leave Blank If You Do Not Want): The margin was drawn around the clinically apparent lesion.  Incisions were then made along these lines, in a tangential fashion, to the appropriate tissue plane and the lesion was extirpated.

## 2023-05-17 RX ORDER — IBUPROFEN 800 MG/1
800 TABLET ORAL EVERY 8 HOURS PRN
COMMUNITY
Start: 2022-04-23

## 2023-05-17 RX ORDER — FERROUS SULFATE 325(65) MG
325 TABLET ORAL
COMMUNITY
Start: 2022-04-23

## 2023-05-17 RX ORDER — LABETALOL 300 MG/1
600 TABLET, FILM COATED ORAL 3 TIMES DAILY
COMMUNITY
Start: 2022-04-25

## 2023-05-17 RX ORDER — LEVETIRACETAM 750 MG/1
750 TABLET ORAL 2 TIMES DAILY
COMMUNITY
Start: 2017-02-22

## 2023-05-17 RX ORDER — NIFEDIPINE 30 MG/1
30 TABLET, EXTENDED RELEASE ORAL 2 TIMES DAILY
COMMUNITY
Start: 2022-04-25